# Patient Record
Sex: MALE | Race: WHITE | NOT HISPANIC OR LATINO | Employment: OTHER | URBAN - METROPOLITAN AREA
[De-identification: names, ages, dates, MRNs, and addresses within clinical notes are randomized per-mention and may not be internally consistent; named-entity substitution may affect disease eponyms.]

---

## 2017-01-05 ENCOUNTER — CLINICAL SUPPORT (OUTPATIENT)
Dept: AUDIOLOGY | Facility: CLINIC | Age: 72
End: 2017-01-05
Payer: COMMERCIAL

## 2017-01-05 NOTE — PROGRESS NOTES
Repaired left hearing aid was returned to patient.  Target gain level on the left aid was increased at the patient's request.  The volume control on the left and right aid was set to work independently, at the patient's request.  The patient was instructed on use of the volume control.  The patient returned the loaner hearing aid.  The patient will contact us as needed.

## 2017-01-25 ENCOUNTER — PATIENT OUTREACH (OUTPATIENT)
Dept: ADMINISTRATIVE | Facility: HOSPITAL | Age: 72
End: 2017-01-25

## 2017-01-25 NOTE — LETTER
January 25, 2017    Omar Araujo  [address] [place] [address] [facility]  [address]  [facility] [address]  Phone: [phone] Dear Mr. Araujo:    [facility] is committed to your overall health.  To help you get the most out of each of your visits, we will review your information to make sure you are up to date on all of your recommended tests and/or procedures.      Dr. Warren         has found that you may be due for:    Tetanus immunization  Shingles immunization  Pneumonia immunization  Influenza vaccine    If you have had any of the above done at another facility, please bring the records or information with you so that your record at [facility] will be complete.     If you are currently taking medication , please bring it with you to your appointment for review.    If you have any questions or concerns, please don't hesitate to call.    Sincerely,    Alyssa Byrne  Clinical Care Coordinator  [facility] [address] [facility] [address]  [place] [id number]  Phone: [phone]   Fax: [phone]

## 2017-03-09 ENCOUNTER — OFFICE VISIT (OUTPATIENT)
Dept: FAMILY MEDICINE | Facility: CLINIC | Age: 72
End: 2017-03-09
Payer: MEDICARE

## 2017-03-09 VITALS
OXYGEN SATURATION: 97 % | WEIGHT: 203.25 LBS | HEIGHT: 68 IN | BODY MASS INDEX: 30.8 KG/M2 | DIASTOLIC BLOOD PRESSURE: 78 MMHG | SYSTOLIC BLOOD PRESSURE: 136 MMHG | RESPIRATION RATE: 18 BRPM | HEART RATE: 74 BPM

## 2017-03-09 DIAGNOSIS — Z23 NEED FOR PNEUMOCOCCAL VACCINE: ICD-10-CM

## 2017-03-09 DIAGNOSIS — I10 ESSENTIAL HYPERTENSION: Primary | ICD-10-CM

## 2017-03-09 DIAGNOSIS — E66.3 OVERWEIGHT(278.02): ICD-10-CM

## 2017-03-09 DIAGNOSIS — E78.5 HYPERLIPIDEMIA, UNSPECIFIED HYPERLIPIDEMIA TYPE: ICD-10-CM

## 2017-03-09 DIAGNOSIS — N52.9 ERECTILE DYSFUNCTION, UNSPECIFIED ERECTILE DYSFUNCTION TYPE: ICD-10-CM

## 2017-03-09 DIAGNOSIS — Z12.5 SCREENING PSA (PROSTATE SPECIFIC ANTIGEN): ICD-10-CM

## 2017-03-09 PROCEDURE — 99214 OFFICE O/P EST MOD 30 MIN: CPT | Mod: S$PBB,,, | Performed by: FAMILY MEDICINE

## 2017-03-09 PROCEDURE — 99999 PR PBB SHADOW E&M-EST. PATIENT-LVL III: CPT | Mod: PBBFAC,,, | Performed by: FAMILY MEDICINE

## 2017-03-09 PROCEDURE — 99213 OFFICE O/P EST LOW 20 MIN: CPT | Mod: PBBFAC,PO,25 | Performed by: FAMILY MEDICINE

## 2017-03-09 PROCEDURE — 90670 PCV13 VACCINE IM: CPT | Mod: PBBFAC,PO | Performed by: FAMILY MEDICINE

## 2017-03-09 RX ORDER — TADALAFIL 20 MG/1
20 TABLET ORAL NIGHTLY PRN
Qty: 3 TABLET | Refills: 5 | Status: SHIPPED | OUTPATIENT
Start: 2017-03-09 | End: 2018-07-17

## 2017-03-09 NOTE — PROGRESS NOTES
Subjective:       Patient ID: Omar Araujo is a 71 y.o. male.    Chief Complaint: Follow-up (6 month f/u)    HPI Comments: Here for f/u chronic medical issues.  States doing well overall.      Hypertension   This is a chronic problem. The current episode started more than 1 year ago. The problem is controlled. Pertinent negatives include no chest pain, palpitations or shortness of breath. Past treatments include diuretics and ACE inhibitors. The current treatment provides moderate improvement. There are no compliance problems.    Hyperlipidemia   This is a chronic problem. The current episode started more than 1 year ago. Pertinent negatives include no chest pain or shortness of breath.     Review of Systems   Constitutional: Negative for chills, fatigue and fever.   Respiratory: Negative for cough, chest tightness and shortness of breath.    Cardiovascular: Negative for chest pain, palpitations and leg swelling.   Gastrointestinal: Negative for abdominal distention and abdominal pain.   Endocrine: Negative for cold intolerance and heat intolerance.   Skin: Negative for rash and wound.   Psychiatric/Behavioral: Negative for decreased concentration. The patient is not nervous/anxious.        Objective:      Physical Exam   Constitutional: He appears well-developed and well-nourished.   HENT:   Head: Normocephalic and atraumatic.   Cardiovascular: Normal rate, regular rhythm and normal heart sounds.    Pulmonary/Chest: Effort normal and breath sounds normal.   Abdominal: Soft. Bowel sounds are normal.   Musculoskeletal: Normal range of motion.   Psychiatric: He has a normal mood and affect.   Nursing note and vitals reviewed.      Assessment:       1. Essential hypertension    2. Erectile dysfunction, unspecified erectile dysfunction type    3. Hyperlipidemia, unspecified hyperlipidemia type    4. Overweight(278.02)    5. Screening PSA (prostate specific antigen)    6. Need for pneumococcal vaccine        Plan:        Essential hypertension  -     CBC auto differential; Future; Expected date: 3/9/17  -     Comprehensive metabolic panel; Future; Expected date: 3/9/17  -     Lipid panel; Future; Expected date: 3/9/17  -     TSH; Future; Expected date: 3/9/17    Erectile dysfunction, unspecified erectile dysfunction type  -     Testosterone; Future; Expected date: 3/9/17    Hyperlipidemia, unspecified hyperlipidemia type  -     CBC auto differential; Future; Expected date: 3/9/17  -     Comprehensive metabolic panel; Future; Expected date: 3/9/17  -     Lipid panel; Future; Expected date: 3/9/17  -     TSH; Future; Expected date: 3/9/17    Overweight(278.02)    Screening PSA (prostate specific antigen)  -     PSA, Screening; Future; Expected date: 3/9/17    Need for pneumococcal vaccine  -     Pneumococcal Conjugate Vaccine (13 Valent) (IM)    Other orders  -     tadalafil (CIALIS) 20 MG Tab; Take 1 tablet (20 mg total) by mouth nightly as needed.  Dispense: 3 tablet; Refill: 5      Will monitor chronic medical issues and continue current plan of care.  Update labs.    Return in about 6 months (around 9/9/2017), or if symptoms worsen or fail to improve.

## 2017-03-09 NOTE — MR AVS SNAPSHOT
[facility]  [address] [facility] [address]  [facility] [address]  Phone: [phone]  Fax: [phone]                  Omar HUDDLESTON Dautrspenser   3/9/2017 10:40 AM   Office Visit    Description:  Male : 1945   Provider:  VIOLET Warren MD   Department:  [facility]           Reason for Visit     Follow-up           Diagnoses this Visit        Comments    Essential hypertension    -  Primary     Erectile dysfunction, unspecified erectile dysfunction type         Hyperlipidemia, unspecified hyperlipidemia type         Overweight(278.02)         Screening PSA (prostate specific antigen)         Need for pneumococcal vaccine                To Do List           Goals (5 Years of Data)     None      Follow-Up and Disposition     Return in about 6 months (around 2017), or if symptoms worsen or fail to improve.       These Medications        Disp Refills Start End    tadalafil (CIALIS) 20 MG Tab 3 tablet 5 3/9/2017 3/9/2018    Take 1 tablet (20 mg total) by mouth nightly as needed. - Oral    Pharmacy: [place] [address] [address]  Ph #: [phone]         [facility] [place] [facility]     [facility] Nurse Care Line -  Assistance  Registered nurses in the [facility] provide clinical advisement, health education, appointment booking, and other advisory services.  Call for this free service at [phone].             Medications           Message regarding Medications     Verify the changes and/or additions to your medication regime listed below are the same as discussed with your clinician today.  If any of these changes or additions are incorrect, please notify your healthcare provider.        START taking these NEW medications        Refills    tadalafil (CIALIS) 20 MG Tab 5    Sig: Take 1 tablet (20 mg total) by mouth nightly as needed.    Class: Print    Route: Oral           Verify that the below list of medications is an accurate  "representation of the medications you are currently taking.  If none reported, the list may be blank. If incorrect, please contact your healthcare provider. Carry this list with you in case of emergency.           Current Medications     ascorbic acid (VITAMIN C) 500 MG tablet Take 500 mg by mouth once daily.    aspirin (ECOTRIN) 325 MG EC tablet Take 325 mg by mouth once daily.    calcium citrate 250 mg calcium Tab Take by mouth.    ERGOCALCIFEROL, VITAMIN D2, (VITAMIN D ORAL) Take by mouth.    fish oil-omega-3 fatty acids 300-1,000 mg capsule Take 1 g by mouth once daily.    lisinopril-hydrochlorothiazide (PRINZIDE,ZESTORETIC) 20-12.5 mg per tablet Take 1 tablet by mouth once daily.    lutein 6 mg Cap Take by mouth.    multivitamin (ONE DAILY MULTIVITAMIN) per tablet Take 1 tablet by mouth once daily.    vitamin E 400 UNIT capsule Take 400 Units by mouth once daily.    tadalafil (CIALIS) 20 MG Tab Take 1 tablet (20 mg total) by mouth nightly as needed.           Clinical Reference Information           Your Vitals Were     BP Pulse Resp Height Weight SpO2    136/78 (BP Location: Left arm, Patient Position: Sitting, BP Method: Manual) 74 18 5' 8" (1.727 m) 92.2 kg (203 lb 4.2 oz) 97%    BMI                30.91 kg/m2          Blood Pressure          Most Recent Value    BP  136/78      Allergies as of 3/9/2017     Iodine And Iodide Containing Products      Immunizations Administered on Date of Encounter - 3/9/2017     Name Date Dose VIS Date Route    Pneumococcal Conjugate - 13 Valent 3/9/2017 0.5 mL 11/5/2015 Intramuscular      Orders Placed During Today's Visit      Normal Orders This Visit    Pneumococcal Conjugate Vaccine (13 Valent) (IM)     Future Labs/Procedures Expected by Expires    CBC auto differential  3/9/2017 9/5/2017    Comprehensive metabolic panel  3/9/2017 9/5/2017    Lipid panel  3/9/2017 9/5/2017    PSA, Screening  3/9/2017 9/5/2017    Testosterone  3/9/2017 5/8/2018    TSH  3/9/2017 9/5/2017    "   [facility] Sign-Up     Activating your [facility] account is as easy as 1-2-3!     1) Visit [email or web address], select Sign Up Now, enter this activation code and your date of birth, then select Next.  [email or web address]  Expires: 4/23/2017 11:18 AM      2) Create a username and password to use when you visit [facility] in the future and select a security question in case you lose your password and select Next.    3) Enter your e-mail address and click Sign Up!    Additional Information  If you have questions, please e-mail [email or web address] or call [phone] to talk to our [facility] staff. Remember, [facility] is NOT to be used for urgent needs. For medical emergencies, dial 911.         Language Assistance Services     ATTENTION: Language assistance services are available, free of charge. Please call [phone].      ATENCIÓN: Si habla petty, tiene a toscano disposición servicios gratuitos de asistencia lingüística. Llame al [phone].     CHÚ Ý: N?u b?n nói Ti?ng Vi?t, có các d?ch v? h? tr? ngôn ng? mi?n phí dành cho b?n. G?i s? [phone].         [facility] complies with applicable Federal civil rights laws and does not discriminate on the basis of race, color, national origin, age, disability, or sex.

## 2017-05-18 ENCOUNTER — CLINICAL SUPPORT (OUTPATIENT)
Dept: AUDIOLOGY | Facility: CLINIC | Age: 72
End: 2017-05-18
Payer: COMMERCIAL

## 2017-05-19 NOTE — PROGRESS NOTES
The patient was seen in the hearing aid clinic and reported each of his hearing aids were not working.  A listening check revealed each aid to be essentially dead.  The vent wall on the right and left cShell were found to be cracked and the  on one of the hearing aids was found to be embedded in the cShell.  The right and left hearing aid was sent for an in warranty repair.  The patient does not wish to have the cShell casing repaired at this time, since this is not under warranty.  A loaner hearing aid was programmed for each ear and issued to the patient for him to use while his hearing aids are being repaired.  The patient should be contacted upon receipt of his repaired aids, so that he can arrange  at the .

## 2017-06-05 ENCOUNTER — LAB VISIT (OUTPATIENT)
Dept: LAB | Facility: HOSPITAL | Age: 72
End: 2017-06-05
Attending: FAMILY MEDICINE
Payer: MEDICARE

## 2017-06-05 DIAGNOSIS — I10 ESSENTIAL HYPERTENSION: ICD-10-CM

## 2017-06-05 DIAGNOSIS — E78.5 HYPERLIPIDEMIA, UNSPECIFIED HYPERLIPIDEMIA TYPE: ICD-10-CM

## 2017-06-05 DIAGNOSIS — N52.9 ERECTILE DYSFUNCTION, UNSPECIFIED ERECTILE DYSFUNCTION TYPE: ICD-10-CM

## 2017-06-05 DIAGNOSIS — Z12.5 SCREENING PSA (PROSTATE SPECIFIC ANTIGEN): ICD-10-CM

## 2017-06-05 LAB
ALBUMIN SERPL BCP-MCNC: 3.7 G/DL
ALP SERPL-CCNC: 79 U/L
ALT SERPL W/O P-5'-P-CCNC: 26 U/L
ANION GAP SERPL CALC-SCNC: 11 MMOL/L
AST SERPL-CCNC: 18 U/L
BASOPHILS # BLD AUTO: 0.03 K/UL
BASOPHILS NFR BLD: 0.4 %
BILIRUB SERPL-MCNC: 0.8 MG/DL
BUN SERPL-MCNC: 23 MG/DL
CALCIUM SERPL-MCNC: 9.6 MG/DL
CHLORIDE SERPL-SCNC: 103 MMOL/L
CHOLEST/HDLC SERPL: 3.4 {RATIO}
CO2 SERPL-SCNC: 25 MMOL/L
COMPLEXED PSA SERPL-MCNC: 0.73 NG/ML
CREAT SERPL-MCNC: 1.2 MG/DL
DIFFERENTIAL METHOD: ABNORMAL
EOSINOPHIL # BLD AUTO: 0.1 K/UL
EOSINOPHIL NFR BLD: 1.9 %
ERYTHROCYTE [DISTWIDTH] IN BLOOD BY AUTOMATED COUNT: 13.1 %
EST. GFR  (AFRICAN AMERICAN): >60 ML/MIN/1.73 M^2
EST. GFR  (NON AFRICAN AMERICAN): >60 ML/MIN/1.73 M^2
GLUCOSE SERPL-MCNC: 102 MG/DL
HCT VFR BLD AUTO: 49.5 %
HDL/CHOLESTEROL RATIO: 29.3 %
HDLC SERPL-MCNC: 205 MG/DL
HDLC SERPL-MCNC: 60 MG/DL
HGB BLD-MCNC: 16.8 G/DL
LDLC SERPL CALC-MCNC: 123.6 MG/DL
LYMPHOCYTES # BLD AUTO: 1.4 K/UL
LYMPHOCYTES NFR BLD: 20 %
MCH RBC QN AUTO: 34.1 PG
MCHC RBC AUTO-ENTMCNC: 33.9 %
MCV RBC AUTO: 101 FL
MONOCYTES # BLD AUTO: 0.4 K/UL
MONOCYTES NFR BLD: 6.3 %
NEUTROPHILS # BLD AUTO: 4.9 K/UL
NEUTROPHILS NFR BLD: 71.1 %
NONHDLC SERPL-MCNC: 145 MG/DL
PLATELET # BLD AUTO: 150 K/UL
PMV BLD AUTO: 12.2 FL
POTASSIUM SERPL-SCNC: 4.6 MMOL/L
PROT SERPL-MCNC: 6.9 G/DL
RBC # BLD AUTO: 4.92 M/UL
SODIUM SERPL-SCNC: 139 MMOL/L
TESTOST SERPL-MCNC: 297 NG/DL
TRIGL SERPL-MCNC: 107 MG/DL
TSH SERPL DL<=0.005 MIU/L-ACNC: 1.49 UIU/ML
WBC # BLD AUTO: 6.95 K/UL

## 2017-06-05 PROCEDURE — 80061 LIPID PANEL: CPT

## 2017-06-05 PROCEDURE — 36415 COLL VENOUS BLD VENIPUNCTURE: CPT | Mod: PO

## 2017-06-05 PROCEDURE — 80053 COMPREHEN METABOLIC PANEL: CPT

## 2017-06-05 PROCEDURE — 84443 ASSAY THYROID STIM HORMONE: CPT

## 2017-06-05 PROCEDURE — 85025 COMPLETE CBC W/AUTO DIFF WBC: CPT

## 2017-06-05 PROCEDURE — 84403 ASSAY OF TOTAL TESTOSTERONE: CPT

## 2017-06-05 PROCEDURE — 84153 ASSAY OF PSA TOTAL: CPT

## 2017-06-21 ENCOUNTER — TELEPHONE (OUTPATIENT)
Dept: FAMILY MEDICINE | Facility: CLINIC | Age: 72
End: 2017-06-21

## 2017-06-21 NOTE — TELEPHONE ENCOUNTER
----- Message from Salvatore Knott sent at 6/21/2017 11:56 AM CDT -----  Contact: Pt  Pt is requesting that a copy of his lab results be mailed to him./ He can be reached at [phone] (home)

## 2017-06-21 NOTE — TELEPHONE ENCOUNTER
See message below. Please advise. Thank you.  You can let me know when mailed and I can notify pt if you would like.

## 2017-06-27 ENCOUNTER — TELEPHONE (OUTPATIENT)
Dept: FAMILY MEDICINE | Facility: CLINIC | Age: 72
End: 2017-06-27

## 2017-06-27 NOTE — TELEPHONE ENCOUNTER
Spoke to pt and he states he still has not received the results via mail. Would like if they can be mailed out again. Please mail out pt results again. Thanks.

## 2017-06-27 NOTE — TELEPHONE ENCOUNTER
----- Message from Karen Landrum sent at 6/27/2017 10:57 AM CDT -----  Contact:  call  //[phone]    Calling to  Get  Test  Results  And a  Copy  , pt  Requested  Twice // please call  For  details

## 2017-08-01 ENCOUNTER — CLINICAL SUPPORT (OUTPATIENT)
Dept: AUDIOLOGY | Facility: CLINIC | Age: 72
End: 2017-08-01
Payer: MEDICARE

## 2017-08-01 NOTE — PROGRESS NOTES
The patient was seen in the hearing aid clinic and reported both of his hearing aids are not working.  The hearing aids were cleaned and checked.  A listening check revealed the right aid to be dead and the left aid to be weak.  Both aids will be sent for an in-warranty repair with attention to [employer].  The patient was issued a loaner hearing aid for each ear, and the loaners were programmed for the patient's hearing loss.  The patient was given a Dri-Aid Kit, and it was recommended that the patient use this kit on a regular basis.  The patient was instructed on use of the kit (verbally and in writing).  The patient should be contacted upon receipt of the repaired aids so that he can pick the aids up at the .

## 2017-09-01 RX ORDER — LISINOPRIL AND HYDROCHLOROTHIAZIDE 12.5; 2 MG/1; MG/1
1 TABLET ORAL DAILY
Qty: 30 TABLET | Refills: 5 | Status: SHIPPED | OUTPATIENT
Start: 2017-09-01 | End: 2017-09-06 | Stop reason: SDUPTHER

## 2017-09-06 NOTE — TELEPHONE ENCOUNTER
Please see request for refill   LOV 3/9/2017  No OV on schedule for follow up appointment with PCP.

## 2017-09-07 RX ORDER — LISINOPRIL AND HYDROCHLOROTHIAZIDE 12.5; 2 MG/1; MG/1
1 TABLET ORAL DAILY
Qty: 30 TABLET | Refills: 0 | Status: SHIPPED | OUTPATIENT
Start: 2017-09-07 | End: 2018-04-03 | Stop reason: SDUPTHER

## 2017-09-11 ENCOUNTER — CLINICAL SUPPORT (OUTPATIENT)
Dept: AUDIOLOGY | Facility: CLINIC | Age: 72
End: 2017-09-11
Payer: MEDICARE

## 2017-09-11 NOTE — PROGRESS NOTES
Patient was seen in the hearing aid clinic and reported he wanted to change the left and right cShell to a dome.  He reported he preferred using the domes when he had loaner aids recently, since they go deeper into his ear canals.  The cShells were replaced with a right and left small closed dome.  The right and left hearing aid was reprogrammed using this different acoustic parameter and a size standard  in each ear.  Feedback Control was run and applied for each aid.  The patient demonstrated appropriate insertion of each aid, and indicated he did not wish to have a retention hook on either aid.  Extra domes were given to the patient.  The patient will contact us as needed.

## 2017-10-18 ENCOUNTER — CLINICAL SUPPORT (OUTPATIENT)
Dept: AUDIOLOGY | Facility: CLINIC | Age: 72
End: 2017-10-18
Payer: MEDICARE

## 2017-10-18 NOTE — PROGRESS NOTES
The patient was seen in the hearing aid clinic and reported his right aid was dead.  The filter was changed on this aid, and a listening check then revealed the aid to sound good.  The patient will contact us as needed.

## 2018-03-20 ENCOUNTER — TELEPHONE (OUTPATIENT)
Dept: AUDIOLOGY | Facility: CLINIC | Age: 73
End: 2018-03-20

## 2018-03-20 NOTE — TELEPHONE ENCOUNTER
Informed pt that the right hearing aid warranty recently  but is eligible for renewal for one final year to cover repairs and Loss/Damage.  Pt confirmed that he did want to renew the warranty for the right hearing aid so this will be completed and the renewal fee will be charged to the patient's account.

## 2018-03-21 ENCOUNTER — CLINICAL SUPPORT (OUTPATIENT)
Dept: AUDIOLOGY | Facility: CLINIC | Age: 73
End: 2018-03-21
Payer: MEDICARE

## 2018-03-21 DIAGNOSIS — Z97.4 WEARS HEARING AID: Primary | ICD-10-CM

## 2018-04-03 DIAGNOSIS — I10 ESSENTIAL HYPERTENSION: Primary | ICD-10-CM

## 2018-04-04 RX ORDER — LISINOPRIL AND HYDROCHLOROTHIAZIDE 12.5; 2 MG/1; MG/1
1 TABLET ORAL DAILY
Qty: 90 TABLET | Refills: 0 | Status: SHIPPED | OUTPATIENT
Start: 2018-04-04 | End: 2018-07-24 | Stop reason: SDUPTHER

## 2018-04-04 NOTE — PROGRESS NOTES
Refill Authorization Note     is requesting a refill authorization.    Brief assessment and rationale for refill: APPROVE: needs labs  Amount/Quantity of medication ordered: 90d        Refills Authorized: Yes  If authorized number of refills: 0        Medication-related problems identified: Requires labs  Medication Therapy Plan: Will order SCR/k/Na; kellie 3 more   Name and strength of medication: lisinopril-hydrochlorothiazide (PRINZIDE,ZESTORETIC) 20-12.5 mg per tablet  How patient will take medication: t1t po daily  Medication reconciliation completed: No  Comments:   Lab Results   Component Value Date    CREATININE 1.2 06/05/2017    BUN 23 06/05/2017     06/05/2017    K 4.6 06/05/2017     06/05/2017    CO2 25 06/05/2017      BP Readings from Last 3 Encounters:   03/09/17 136/78   07/18/16 114/83   06/13/16 136/84

## 2018-04-07 ENCOUNTER — LAB VISIT (OUTPATIENT)
Dept: LAB | Facility: HOSPITAL | Age: 73
End: 2018-04-07
Attending: FAMILY MEDICINE
Payer: MEDICARE

## 2018-04-07 DIAGNOSIS — I10 ESSENTIAL HYPERTENSION: ICD-10-CM

## 2018-04-07 LAB
CREAT SERPL-MCNC: 1.2 MG/DL
EST. GFR  (AFRICAN AMERICAN): >60 ML/MIN/1.73 M^2
EST. GFR  (NON AFRICAN AMERICAN): >60 ML/MIN/1.73 M^2
POTASSIUM SERPL-SCNC: 4.5 MMOL/L
SODIUM SERPL-SCNC: 140 MMOL/L

## 2018-04-07 PROCEDURE — 84132 ASSAY OF SERUM POTASSIUM: CPT

## 2018-04-07 PROCEDURE — 36415 COLL VENOUS BLD VENIPUNCTURE: CPT | Mod: PO

## 2018-04-07 PROCEDURE — 82565 ASSAY OF CREATININE: CPT

## 2018-04-07 PROCEDURE — 84295 ASSAY OF SERUM SODIUM: CPT

## 2018-04-09 ENCOUNTER — TELEPHONE (OUTPATIENT)
Dept: FAMILY MEDICINE | Facility: CLINIC | Age: 73
End: 2018-04-09

## 2018-04-09 NOTE — TELEPHONE ENCOUNTER
----- Message from Nia Paul sent at 4/9/2018  7:26 AM CDT -----  Type:  Patient Returning Call  Who Called:  self  Who Left Message for Patient:  Nelida  Does the patient know what this is regarding? Copy of lab results  Best Call Back Number: [phone]  Additional Information:

## 2018-04-23 ENCOUNTER — TELEPHONE (OUTPATIENT)
Dept: FAMILY MEDICINE | Facility: CLINIC | Age: 73
End: 2018-04-23

## 2018-04-23 NOTE — LETTER
April 23, 2018    Omar Araujo  [address]  [address] [facility] [address] [place] [address]  Phone: [phone]  Fax: [phone] Dear {MR/MRS/MS/:14792} Leydiil:    ***      If you have any questions or concerns, please don't hesitate to call.    Sincerely,        Sindy Heck LPN

## 2018-04-23 NOTE — LETTER
April 23, 2018    Omar Araujo  [address]  [address] [facility]  [facility] [address]  Phone: [phone]  Fax: [phone] Dear Mr. Araujo:    I am sending you a copy of the labs results that you requested.    Also for your convenience, I have included a copy of you past labs as well.     If you have any questions or concerns, please don't hesitate to call.    Thank you for choosing [facility] and do make it a great day.       Sincerely,        Sindy Heck LPN

## 2018-04-23 NOTE — TELEPHONE ENCOUNTER
----- Message from Olivia Lopez sent at 4/23/2018  9:50 AM CDT -----  Contact: pt [phone]  Patient called and asked if you will send him a copy of his recent labs test.

## 2018-04-24 ENCOUNTER — CLINICAL SUPPORT (OUTPATIENT)
Dept: AUDIOLOGY | Facility: CLINIC | Age: 73
End: 2018-04-24
Payer: MEDICARE

## 2018-04-24 NOTE — PROGRESS NOTES
The patient was seen in the hearing aid clinic.  He requested that his volume control be set so that he can control the volume on each aid independently. Quick Sync was deactivated for volume control today, and the patient was instructed on how to manipulate the volume control buttons. The patient requested a retention hook be placed on each . This was done today, and the patient was instructed on how to insert the aids properly with the added retention hooks, and he demonstrated this properly.  The wax filter on each aid was changed, and a listening check revealed each aid to sound good. The patient requested reinstruction on how to change the wax filter.  This was reviewed today, and the patient demonstrated this appropriately. An annual hearing evaluation was recommended.  The pateint scheduled this for May 18, 2018.

## 2018-05-16 DIAGNOSIS — H91.90 HEARING DIFFICULTY, UNSPECIFIED LATERALITY: Primary | ICD-10-CM

## 2018-05-18 ENCOUNTER — CLINICAL SUPPORT (OUTPATIENT)
Dept: AUDIOLOGY | Facility: CLINIC | Age: 73
End: 2018-05-18
Payer: MEDICARE

## 2018-05-18 ENCOUNTER — OFFICE VISIT (OUTPATIENT)
Dept: OTOLARYNGOLOGY | Facility: CLINIC | Age: 73
End: 2018-05-18
Payer: MEDICARE

## 2018-05-18 VITALS
WEIGHT: 205.94 LBS | HEIGHT: 68 IN | BODY MASS INDEX: 31.21 KG/M2 | HEART RATE: 68 BPM | SYSTOLIC BLOOD PRESSURE: 140 MMHG | DIASTOLIC BLOOD PRESSURE: 87 MMHG

## 2018-05-18 DIAGNOSIS — H61.22 IMPACTED CERUMEN OF LEFT EAR: ICD-10-CM

## 2018-05-18 DIAGNOSIS — H93.293 IMPAIRED AUDITORY DISCRIMINATION, BILATERAL: ICD-10-CM

## 2018-05-18 DIAGNOSIS — H90.3 BILATERAL SENSORINEURAL HEARING LOSS: Primary | ICD-10-CM

## 2018-05-18 DIAGNOSIS — Z97.4 WEARS HEARING AID: ICD-10-CM

## 2018-05-18 DIAGNOSIS — H90.3 ASYMMETRICAL SENSORINEURAL HEARING LOSS: Primary | ICD-10-CM

## 2018-05-18 PROCEDURE — 99211 OFF/OP EST MAY X REQ PHY/QHP: CPT | Mod: PBBFAC,PO

## 2018-05-18 PROCEDURE — G0268 REMOVAL OF IMPACTED WAX MD: HCPCS | Mod: S$PBB,,, | Performed by: NURSE PRACTITIONER

## 2018-05-18 PROCEDURE — 99213 OFFICE O/P EST LOW 20 MIN: CPT | Mod: 25,S$PBB,, | Performed by: NURSE PRACTITIONER

## 2018-05-18 PROCEDURE — 92567 TYMPANOMETRY: CPT | Mod: PBBFAC,PO | Performed by: AUDIOLOGIST-HEARING AID FITTER

## 2018-05-18 PROCEDURE — G0268 REMOVAL OF IMPACTED WAX MD: HCPCS | Mod: PBBFAC,PO,LT | Performed by: NURSE PRACTITIONER

## 2018-05-18 PROCEDURE — 92557 COMPREHENSIVE HEARING TEST: CPT | Mod: PBBFAC,PO | Performed by: AUDIOLOGIST-HEARING AID FITTER

## 2018-05-18 PROCEDURE — 99999 PR PBB SHADOW E&M-EST. PATIENT-LVL III: CPT | Mod: PBBFAC,,, | Performed by: NURSE PRACTITIONER

## 2018-05-18 PROCEDURE — 99213 OFFICE O/P EST LOW 20 MIN: CPT | Mod: PBBFAC,27,PO,25 | Performed by: NURSE PRACTITIONER

## 2018-05-18 PROCEDURE — 99999 PR PBB SHADOW E&M-EST. PATIENT-LVL I: CPT | Mod: PBBFAC,,,

## 2018-05-18 NOTE — PROGRESS NOTES
The patient was seen for a hearing aid follow-up appointment after having a hearing evaluation performed today. Today's hearing thresholds were entered into the hearing aid software, and the programming for the left and right hearing aid was recalculated using the current hearing thresholds. The speaker on the left aid was changed to a size 2xP speaker.  Feedback Control was run and applied for the left aid. The wax filter on the right hearing aid was changed. A listening check revealed each aid to sound good. A replacement Dri-Aid Kit was given to the patient with instructions on how to use it. Extra domes and wax filters were given to the patient. An annual audiological evaluation was recommended. The patient will contact us as needed.

## 2018-05-18 NOTE — PROGRESS NOTES
Omar Araujo was seen 05/18/2018 for an audiological evaluation. Patient complains of hearing loss. Pt reports a strong Hx of noise exposure with hunting, sometimes without hearing protection. He denies tinnitus. He wears binaural amplification. He had a tympanoplasty procedure by Dr Cueto in 2015. He does not perceive any significant changes since his last test.     Results reveal a normal-to-severe sensorineural hearing loss for the right ear, and  moderate-to-profound primarily sensorineural hearing loss for the left ear with the exception of an air/bone gap at 4K Hz. Pt has a Hx of that same air/bone gap.  No significant changes were noted when compared to previous test on 1215/16.  Speech Reception Thresholds were  35 dBHL for the right ear and 45 dBHL for the left ear.    Word recognition scores were good for the right ear and good for the left ear.   Tympanograms were Type As for the right ear and Type As for the left ear.    Audiogram results were reviewed in detail with patient and all questions were answered. Results will be reviewed by ENT at the completion of this note. Recommend continued daily use binaural amplification, annual hearing tests to monitor hearing loss and hearing protection in loud noise.

## 2018-05-18 NOTE — PROGRESS NOTES
Subjective:       Patient ID: Omar Araujo is a 72 y.o. male.    Chief Complaint: Cerumen Impaction and Hearing Loss (audio today/ annual)    HPI   Patient had left tympanoplasty by Dr. Cueto on 3/3/15. He wears bilateral hearing aids. He returns for audiogram and hearing aid adjustment. He denies otalgia, otorrhea, aural pressure, or any other ENT issues or concerns at this time.     Review of Systems   Constitutional: Negative.    HENT: Positive for hearing loss.    Eyes: Negative.    Respiratory: Negative.    Cardiovascular: Negative.    Gastrointestinal: Negative.    Musculoskeletal: Negative.    Skin: Negative.    Neurological: Negative.    Hematological: Negative.    Psychiatric/Behavioral: Negative.        Objective:      Physical Exam   Constitutional: He is oriented to person, place, and time. Vital signs are normal. He appears well-developed and well-nourished. He is cooperative. He does not appear ill. No distress.   HENT:   Head: Normocephalic and atraumatic.   Right Ear: Hearing, tympanic membrane, external ear and ear canal normal. Tympanic membrane is not erythematous. No middle ear effusion.   Left Ear: Hearing, tympanic membrane, external ear and ear canal normal. Tympanic membrane is not erythematous.  No middle ear effusion.   Nose: Nose normal. No mucosal edema or rhinorrhea.   Mouth/Throat: Uvula is midline, oropharynx is clear and moist and mucous membranes are normal. Mucous membranes are not pale, not dry and not cyanotic. No oral lesions. No oropharyngeal exudate, posterior oropharyngeal edema or posterior oropharyngeal erythema.   Eyes: EOM and lids are normal. Right eye exhibits no discharge. Left eye exhibits no discharge. No scleral icterus.   Neck: Trachea normal and normal range of motion. Neck supple. No tracheal deviation present. No thyroid mass and no thyromegaly present.   Cardiovascular: Normal rate.    Pulmonary/Chest: Effort normal. No stridor. No respiratory distress.  He has no wheezes.   Musculoskeletal: Normal range of motion.   Lymphadenopathy:        Head (right side): No submental, no submandibular, no tonsillar, no preauricular and no posterior auricular adenopathy present.        Head (left side): No submental, no submandibular, no tonsillar, no preauricular and no posterior auricular adenopathy present.     He has no cervical adenopathy.        Right cervical: No superficial cervical and no posterior cervical adenopathy present.       Left cervical: No superficial cervical and no posterior cervical adenopathy present.   Neurological: He is alert and oriented to person, place, and time. He has normal strength. Coordination and gait normal.   Skin: Skin is warm, dry and intact. No lesion and no rash noted. He is not diaphoretic. No cyanosis. No pallor.   Psychiatric: He has a normal mood and affect. His speech is normal and behavior is normal. Judgment and thought content normal. Cognition and memory are normal.   Nursing note and vitals reviewed.      SEPARATE PROCEDURE IN OFFICE:   Procedure: Removal of impacted cerumen, LEFT   Pre Procedure Diagnosis: Cerumen Impaction   Post Procedure Diagnosis: Cerumen Impaction   Verbal informed consent in regards to risk of trauma to ear canal, ear drum or hearing, discomfort during procedure and/or inability to remove cerumen impaction in one session or unforeseen events or complications.   No anesthesia.     Procedure in detail:   Ear canal visualized bilateral with appropriate size ear speculum utilizing Operating Head Binocular Otomicroscope   Utilizing the following:  Curet used AS. The impacted cerumen of the ear canals was removed atraumatically. The TM and EAC were then inspected and found to be clear of wax. See description of TMs/EACs in PE above.   Complications: No   Condition: Improved/Good     Assessment:     Asymmetric (rajwinder) sloping SNHL AU    Wears hearing aids  Plan:     Proceed with hearing aid adjustment     Return as needed for further ENT concerns

## 2018-06-28 ENCOUNTER — CLINICAL SUPPORT (OUTPATIENT)
Dept: AUDIOLOGY | Facility: CLINIC | Age: 73
End: 2018-06-28
Payer: MEDICARE

## 2018-06-28 NOTE — PROGRESS NOTES
The patient was seen in the hearing aid clinic and reported the right hearing aid does not fit as well as the left aid.  The  wire was changed to a size 1, and the retention hook was changed and shortened.  If this is not saitisfactory, the patient will let us know and we may go back to the size 2  wire with the shortened retention hook.  The patient will contact us as needed.

## 2018-06-29 ENCOUNTER — TELEPHONE (OUTPATIENT)
Dept: FAMILY MEDICINE | Facility: CLINIC | Age: 73
End: 2018-06-29

## 2018-06-29 NOTE — TELEPHONE ENCOUNTER
----- Message from Marzena Nieto sent at 6/29/2018 11:14 AM CDT -----  Contact: Patient  Type:  Patient Returning Call    Who Called:  Omar patient   Who Left Message for Patient:  Marli  Does the patient know what this is regarding?:  Unsure  Best Call Back Number:  [phone]  Additional Information:

## 2018-06-29 NOTE — TELEPHONE ENCOUNTER
Received request for surgical clearance from Dr Avery DDS with [facility]. However, patient has not been seen in clinic since 3/2017 and requires a visit to complete this clearance.    Called and left message to call back and ask for assistance with scheduling.   Procedure date is unknown at the time of this encounter.

## 2018-06-29 NOTE — TELEPHONE ENCOUNTER
----- Message from Marzena Nieto sent at 6/29/2018 12:34 PM CDT -----  Contact: Patient  Type:  Patient Returning Call    Who Called:  Omar  Who Left Message for Patient:  Marli  Does the patient know what this is regarding?: Unsure  Best Call Back Number:  [phone]  Additional Information:

## 2018-06-29 NOTE — TELEPHONE ENCOUNTER
----- Message from Marzena Nieto sent at 6/29/2018 11:14 AM CDT -----  Contact: Patient  Type:  Patient Returning Call    Who Called:  Omar patient   Who Left Message for Patient:  Marli  Does the patient know what this is regarding?:  Unsure  Best Call Back Number:  [phone]  Additional Information:

## 2018-07-16 ENCOUNTER — TELEPHONE (OUTPATIENT)
Dept: FAMILY MEDICINE | Facility: CLINIC | Age: 73
End: 2018-07-16

## 2018-07-17 ENCOUNTER — OFFICE VISIT (OUTPATIENT)
Dept: FAMILY MEDICINE | Facility: CLINIC | Age: 73
End: 2018-07-17
Payer: MEDICARE

## 2018-07-17 ENCOUNTER — LAB VISIT (OUTPATIENT)
Dept: LAB | Facility: HOSPITAL | Age: 73
End: 2018-07-17
Attending: NURSE PRACTITIONER
Payer: MEDICARE

## 2018-07-17 VITALS
RESPIRATION RATE: 14 BRPM | OXYGEN SATURATION: 99 % | SYSTOLIC BLOOD PRESSURE: 126 MMHG | BODY MASS INDEX: 31.24 KG/M2 | WEIGHT: 206.13 LBS | HEART RATE: 75 BPM | HEIGHT: 68 IN | DIASTOLIC BLOOD PRESSURE: 74 MMHG | TEMPERATURE: 98 F

## 2018-07-17 DIAGNOSIS — Z79.899 MEDICATION MANAGEMENT: ICD-10-CM

## 2018-07-17 DIAGNOSIS — Z01.818 PRE-OP EXAMINATION: Primary | ICD-10-CM

## 2018-07-17 DIAGNOSIS — Z12.5 ENCOUNTER FOR SCREENING FOR MALIGNANT NEOPLASM OF PROSTATE: ICD-10-CM

## 2018-07-17 DIAGNOSIS — I10 ESSENTIAL HYPERTENSION: ICD-10-CM

## 2018-07-17 DIAGNOSIS — Z01.818 PRE-OP EXAMINATION: ICD-10-CM

## 2018-07-17 LAB
ALBUMIN SERPL BCP-MCNC: 4.2 G/DL
ALP SERPL-CCNC: 72 U/L
ALT SERPL W/O P-5'-P-CCNC: 27 U/L
ANION GAP SERPL CALC-SCNC: 10 MMOL/L
AST SERPL-CCNC: 19 U/L
BASOPHILS # BLD AUTO: 0.05 K/UL
BASOPHILS NFR BLD: 0.8 %
BILIRUB SERPL-MCNC: 0.7 MG/DL
BUN SERPL-MCNC: 29 MG/DL
CALCIUM SERPL-MCNC: 10.5 MG/DL
CHLORIDE SERPL-SCNC: 104 MMOL/L
CO2 SERPL-SCNC: 25 MMOL/L
COMPLEXED PSA SERPL-MCNC: 0.7 NG/ML
CREAT SERPL-MCNC: 1.3 MG/DL
DIFFERENTIAL METHOD: ABNORMAL
EOSINOPHIL # BLD AUTO: 0.2 K/UL
EOSINOPHIL NFR BLD: 3 %
ERYTHROCYTE [DISTWIDTH] IN BLOOD BY AUTOMATED COUNT: 12.9 %
EST. GFR  (AFRICAN AMERICAN): >60 ML/MIN/1.73 M^2
EST. GFR  (NON AFRICAN AMERICAN): 54.5 ML/MIN/1.73 M^2
GLUCOSE SERPL-MCNC: 103 MG/DL
HCT VFR BLD AUTO: 49.9 %
HGB BLD-MCNC: 16.9 G/DL
IMM GRANULOCYTES # BLD AUTO: 0.01 K/UL
IMM GRANULOCYTES NFR BLD AUTO: 0.2 %
LYMPHOCYTES # BLD AUTO: 1.6 K/UL
LYMPHOCYTES NFR BLD: 25 %
MCH RBC QN AUTO: 34.1 PG
MCHC RBC AUTO-ENTMCNC: 33.9 G/DL
MCV RBC AUTO: 101 FL
MONOCYTES # BLD AUTO: 0.5 K/UL
MONOCYTES NFR BLD: 7.8 %
NEUTROPHILS # BLD AUTO: 4 K/UL
NEUTROPHILS NFR BLD: 63.2 %
NRBC BLD-RTO: 0 /100 WBC
PLATELET # BLD AUTO: 149 K/UL
PMV BLD AUTO: 12.4 FL
POTASSIUM SERPL-SCNC: 4.6 MMOL/L
PROT SERPL-MCNC: 7.6 G/DL
RBC # BLD AUTO: 4.95 M/UL
SODIUM SERPL-SCNC: 139 MMOL/L
TSH SERPL DL<=0.005 MIU/L-ACNC: 1.2 UIU/ML
WBC # BLD AUTO: 6.32 K/UL

## 2018-07-17 PROCEDURE — 85025 COMPLETE CBC W/AUTO DIFF WBC: CPT

## 2018-07-17 PROCEDURE — 99214 OFFICE O/P EST MOD 30 MIN: CPT | Mod: PBBFAC,PO | Performed by: NURSE PRACTITIONER

## 2018-07-17 PROCEDURE — 99214 OFFICE O/P EST MOD 30 MIN: CPT | Mod: S$PBB,,, | Performed by: NURSE PRACTITIONER

## 2018-07-17 PROCEDURE — 99999 PR PBB SHADOW E&M-EST. PATIENT-LVL IV: CPT | Mod: PBBFAC,,, | Performed by: NURSE PRACTITIONER

## 2018-07-17 PROCEDURE — 36415 COLL VENOUS BLD VENIPUNCTURE: CPT | Mod: PO

## 2018-07-17 PROCEDURE — 84443 ASSAY THYROID STIM HORMONE: CPT

## 2018-07-17 PROCEDURE — 80053 COMPREHEN METABOLIC PANEL: CPT

## 2018-07-17 PROCEDURE — 84153 ASSAY OF PSA TOTAL: CPT

## 2018-07-17 NOTE — PROGRESS NOTES
Subjective:       Patient ID: Omar Araujo is a 72 y.o. male.    Chief Complaint: Pre-op Exam (dental implants-oral meds with local)    Mr. Araujo is a new patient to me. He presents today for pre op clearance    HPI   Scheduled for extractions and dental implant placement on 7/30/18 by Dr. Varela at [facility] for dental [facility].    On ASA daily--stopped about 4 days ago.     HTN: controlled    Due for labs  Vitals:    07/17/18 1001   BP: 126/74   Pulse: 75   Resp: 14   Temp: 98.3 °F (36.8 °C)     Review of Systems   Constitutional: Negative for diaphoresis and fever.   HENT: Negative for facial swelling and trouble swallowing.    Eyes: Negative for discharge and redness.   Respiratory: Negative for cough and shortness of breath.    Cardiovascular: Negative for chest pain and palpitations.   Gastrointestinal: Negative for abdominal pain and diarrhea.   Genitourinary: Negative for difficulty urinating.   Musculoskeletal: Negative for gait problem.   Skin: Negative for rash and wound.   Neurological: Negative for dizziness, facial asymmetry and speech difficulty.   Psychiatric/Behavioral: Negative for confusion. The patient is not nervous/anxious.        Past Medical History:   Diagnosis Date    Hypertension      Objective:      Physical Exam   Constitutional: He is oriented to person, place, and time. He does not have a sickly appearance. No distress.   HENT:   Head: Normocephalic.   Right Ear: Hearing normal.   Left Ear: Hearing normal.   Nose: Nose normal.   Eyes: Conjunctivae and lids are normal.   Neck: No JVD present. No tracheal deviation present.   Cardiovascular: Normal rate and normal heart sounds.    Pulmonary/Chest: Effort normal and breath sounds normal.   Abdominal: Normal appearance. He exhibits no distension.   Musculoskeletal: Normal range of motion. He exhibits no deformity.   Neurological: He is alert and oriented to person, place, and time.   Skin: He is not diaphoretic. No  pallor.   Psychiatric: He has a normal mood and affect. His speech is normal and behavior is normal. Judgment and thought content normal. Cognition and memory are normal.   Nursing note and vitals reviewed.      Assessment:       1. Pre-op examination    2. Essential hypertension    3. Encounter for screening for malignant neoplasm of prostate    4. Medication management        Plan:       Pre-op examination  -     CBC auto differential; Future; Expected date: 07/17/2018  -     Comprehensive metabolic panel; Future; Expected date: 07/17/2018    Essential hypertension  -     Comprehensive metabolic panel; Future; Expected date: 07/17/2018  -     TSH; Future; Expected date: 07/17/2018    Encounter for screening for malignant neoplasm of prostate  -     PSA, Screening; Future; Expected date: 07/17/2018    Medication management   Continue to hold ASA prior to procedure; resume post procedure based on Dr. Varela's recommendations       Patient is cleared for scheduled procedure   Follow-up for q6months with PCP, me as needed.

## 2018-07-18 DIAGNOSIS — I10 ESSENTIAL HYPERTENSION: Primary | ICD-10-CM

## 2018-07-18 DIAGNOSIS — E78.2 MIXED HYPERLIPIDEMIA: ICD-10-CM

## 2018-07-24 RX ORDER — LISINOPRIL AND HYDROCHLOROTHIAZIDE 12.5; 2 MG/1; MG/1
1 TABLET ORAL DAILY
Qty: 90 TABLET | Refills: 0 | Status: ON HOLD | OUTPATIENT
Start: 2018-07-24 | End: 2018-08-31 | Stop reason: HOSPADM

## 2018-07-24 NOTE — PROGRESS NOTES
Refill Authorization Note     is requesting a refill authorization.    Brief assessment and rationale for refill: APPROVE; Needs Appt  Amount/Quantity of medication ordered: 90d  Date of last appointment: 3/9/2017     Refills Authorized: Yes  If authorized number of refills: 0        Medication-related problems identified: Requires appointment  Medication Therapy Plan: HTN- commented as controlled (OVBUBBA Gaviria 7/17/18); Labs-wnl; Needs Annual  Name and strength of medication: lisinopril-hydrochlorothiazide (PRINZIDE,ZESTORETIC) 20-12.5 mg per tablet  How patient will take medication: t1t po qd  Medication reconciliation completed: No  Comments:   BP Readings from Last 3 Encounters:   07/17/18 126/74   05/18/18 (!) 140/87   03/09/17 136/78     Lab Results   Component Value Date    CREATININE 1.3 07/17/2018    BUN 29 (H) 07/17/2018     07/17/2018    K 4.6 07/17/2018     07/17/2018    CO2 25 07/17/2018

## 2018-07-25 ENCOUNTER — TELEPHONE (OUTPATIENT)
Dept: AUDIOLOGY | Facility: CLINIC | Age: 73
End: 2018-07-25

## 2018-07-25 NOTE — TELEPHONE ENCOUNTER
Spoke with patient - he lost the retention wire for one of his hearing aids. He will drop it off for another modified (shortened) retention wire to be added to the aid. He will be called when this is complete so he can come  the repaired hearing aid.     ----- Message from Shonna Liriano sent at 7/25/2018 12:44 PM CDT -----  Contact: [phone]  Patient requesting a call from [facility], stated this his 2nd time calling.  Patient was ensured he will get a call back at [facility].  Patient contact #  [phone]

## 2018-07-27 NOTE — TELEPHONE ENCOUNTER
Patient states he just saw iva hernandez and did lab work. Patient states he will think about it and call back.

## 2018-08-30 PROBLEM — D72.829 LEUKOCYTOSIS: Status: ACTIVE | Noted: 2018-08-30

## 2018-08-30 PROBLEM — R07.9 CHEST PAIN: Status: ACTIVE | Noted: 2018-08-30

## 2018-08-30 PROBLEM — I21.4 NSTEMI (NON-ST ELEVATED MYOCARDIAL INFARCTION): Status: ACTIVE | Noted: 2018-08-30

## 2018-09-12 PROBLEM — I25.10 CORONARY ARTERY DISEASE DUE TO CALCIFIED CORONARY LESION: Status: ACTIVE | Noted: 2018-09-12

## 2018-09-12 PROBLEM — I25.84 CORONARY ARTERY DISEASE DUE TO CALCIFIED CORONARY LESION: Status: ACTIVE | Noted: 2018-09-12

## 2018-10-02 ENCOUNTER — OFFICE VISIT (OUTPATIENT)
Dept: FAMILY MEDICINE | Facility: CLINIC | Age: 73
End: 2018-10-02
Payer: MEDICARE

## 2018-10-02 VITALS
BODY MASS INDEX: 28.8 KG/M2 | DIASTOLIC BLOOD PRESSURE: 70 MMHG | OXYGEN SATURATION: 97 % | SYSTOLIC BLOOD PRESSURE: 110 MMHG | HEART RATE: 75 BPM | HEIGHT: 68 IN | WEIGHT: 190.06 LBS | RESPIRATION RATE: 18 BRPM

## 2018-10-02 DIAGNOSIS — I10 ESSENTIAL HYPERTENSION: Primary | ICD-10-CM

## 2018-10-02 DIAGNOSIS — E78.00 PURE HYPERCHOLESTEROLEMIA: ICD-10-CM

## 2018-10-02 DIAGNOSIS — I21.4 NSTEMI (NON-ST ELEVATED MYOCARDIAL INFARCTION): ICD-10-CM

## 2018-10-02 PROCEDURE — 99214 OFFICE O/P EST MOD 30 MIN: CPT | Mod: S$PBB,,, | Performed by: NURSE PRACTITIONER

## 2018-10-02 PROCEDURE — 99214 OFFICE O/P EST MOD 30 MIN: CPT | Mod: PBBFAC,PO | Performed by: NURSE PRACTITIONER

## 2018-10-02 PROCEDURE — 99999 PR PBB SHADOW E&M-EST. PATIENT-LVL IV: CPT | Mod: PBBFAC,,, | Performed by: NURSE PRACTITIONER

## 2018-10-02 NOTE — PROGRESS NOTES
Subjective:       Patient ID: Omar Araujo is a 73 y.o. male.    Chief Complaint: Hospital Follow Up (Mild myocardial infarct)  He was last seen in primary care by Briana on 02/08/2017. This is his first time seeing me in the clinic.   HPI   He is here for hospital follow-up. He was admitted to [facility] on 09/12/2018 with diagnosis of NSTEMI. He had stent placement and states he has not experienced any complications or pain since the incident.States feels much better but feels like just don't have all of his strength back.   Vitals:    10/02/18 0852   BP: 110/70   Pulse: 75   Resp: 18     BP Readings from Last 3 Encounters:   10/02/18 110/70   09/12/18 124/74   08/31/18 (!) 130/50     Review of Systems   Constitutional: Negative for fatigue.   Respiratory: Negative for chest tightness and shortness of breath.    Cardiovascular: Negative for leg swelling.       He has a follow up cardiology appointment on 10/04/2018.   States he has been walking daily since home   He does not have home healthy  Denies any changes to medications since admission  Does not need any assistance devices  He lives with his wife and states she is in home with him if any problems    CMP  Sodium   Date Value Ref Range Status   09/12/2018 135 (L) 136 - 145 mmol/L Final     Potassium   Date Value Ref Range Status   09/12/2018 4.5 3.5 - 5.1 mmol/L Final     Chloride   Date Value Ref Range Status   09/12/2018 104 95 - 110 mmol/L Final     CO2   Date Value Ref Range Status   09/12/2018 23 22 - 31 mmol/L Final     Glucose   Date Value Ref Range Status   09/12/2018 104 70 - 110 mg/dL Final     Comment:     The ADA recommends the following guidelines for fasting glucose:  Normal:       less than 100 mg/dL  Prediabetes:  100 mg/dL to 125 mg/dL  Diabetes:     126 mg/dL or higher       BUN, Bld   Date Value Ref Range Status   09/12/2018 23 (H) 9 - 21 mg/dL Final     Creatinine   Date Value Ref Range Status   09/12/2018 1.12 0.50 - 1.40 mg/dL Final      Calcium   Date Value Ref Range Status   09/12/2018 9.3 8.4 - 10.2 mg/dL Final     Total Protein   Date Value Ref Range Status   08/29/2018 7.4 6.0 - 8.4 g/dL Final     Albumin   Date Value Ref Range Status   08/29/2018 4.4 3.5 - 5.2 g/dL Final     Total Bilirubin   Date Value Ref Range Status   08/29/2018 0.8 0.2 - 1.3 mg/dL Final     Alkaline Phosphatase   Date Value Ref Range Status   08/29/2018 83 38 - 145 U/L Final     AST   Date Value Ref Range Status   08/29/2018 27 17 - 59 U/L Final     ALT   Date Value Ref Range Status   08/29/2018 40 10 - 44 U/L Final     Anion Gap   Date Value Ref Range Status   09/12/2018 8 8 - 16 mmol/L Final     eGFR if    Date Value Ref Range Status   09/12/2018 >60 >60 mL/min/1.73 m^2 Final     eGFR if non    Date Value Ref Range Status   09/12/2018 >60 >60 mL/min/1.73 m^2 Final     Comment:     Calculation used to obtain the estimated glomerular filtration  rate (eGFR) is the CKD-EPI equation.        I have reviewed his discharge summary from 09/12/2018.  Medication review discussed in detail with patient, states taking all medications as ordered  Objective:      Physical Exam   Constitutional: He is oriented to person, place, and time. Vital signs are normal. He appears well-developed and well-nourished. He is active and cooperative.  Non-toxic appearance. He does not have a sickly appearance. He does not appear ill. No distress.   Appears vibrant and ambulation without any complications. Seems well today and no voiced complaints   HENT:   Head: Normocephalic and atraumatic.   Right Ear: Tympanic membrane, external ear and ear canal normal.   Left Ear: Tympanic membrane, external ear and ear canal normal.   Ears:    Nose: Nose normal.   Mouth/Throat: Uvula is midline, oropharynx is clear and moist and mucous membranes are normal.   Eyes: Lids are normal.   Neck: Trachea normal, normal range of motion, full passive range of motion without pain and  phonation normal. Neck supple.   Cardiovascular: Normal rate and regular rhythm.   Pulmonary/Chest: Effort normal and breath sounds normal.   Abdominal: Soft. Bowel sounds are normal. There is no splenomegaly or hepatomegaly. There is no tenderness.   Musculoskeletal: Normal range of motion.   Lymphadenopathy:        Head (right side): No submental, no submandibular, no tonsillar, no preauricular, no posterior auricular and no occipital adenopathy present.        Head (left side): No submental, no submandibular, no tonsillar, no preauricular, no posterior auricular and no occipital adenopathy present.     He has no cervical adenopathy.   Neurological: He is alert and oriented to person, place, and time.   Skin: Skin is warm, dry and intact.   Psychiatric: He has a normal mood and affect. His speech is normal and behavior is normal. Judgment and thought content normal. Cognition and memory are normal.   Nursing note and vitals reviewed.      Assessment & Plan:       Essential hypertension    Pure hypercholesterolemia    NSTEMI (non-ST elevated myocardial infarction)    Instructed to keep appointment with cardiologist and maintain plans to start cardiac rehab  He does live home with spouse  Denies the need for any medication refills  Does not have home health at this time and currently denies need for those services  Does not require any new DME at this time     Medication List           Accurate as of 10/2/18 11:59 PM. If you have any questions, ask your nurse or doctor.               CONTINUE taking these medications    aspirin 81 MG Chew  Take 1 tablet (81 mg total) by mouth once daily.     atorvastatin 80 MG tablet  Commonly known as:  LIPITOR  Take 1 tablet (80 mg total) by mouth once daily.     calcium citrate 250 mg calcium Tab     carvedilol 6.25 MG tablet  Commonly known as:  COREG  Take 1 tablet (6.25 mg total) by mouth 2 (two) times daily.     fish oil-omega-3 fatty acids 300-1,000 mg capsule      GLUCOSAMINE ORAL     lisinopril 10 MG tablet  Take 1 tablet (10 mg total) by mouth once daily.     lutein 6 mg Cap     ONE DAILY MULTIVITAMIN per tablet  Generic drug:  multivitamin     ticagrelor 90 mg tablet  Commonly known as:  BRILINTA  Take 1 tablet (90 mg total) by mouth 2 (two) times daily.     VITAMIN C 500 MG tablet  Generic drug:  ascorbic acid (vitamin C)     VITAMIN D3 ORAL     vitamin E 400 UNIT capsule              Follow-up in about 3 months (around 1/2/2019), or if symptoms worsen or fail to improve.

## 2018-12-24 ENCOUNTER — TELEPHONE (OUTPATIENT)
Dept: FAMILY MEDICINE | Facility: CLINIC | Age: 73
End: 2018-12-24

## 2018-12-24 NOTE — TELEPHONE ENCOUNTER
----- Message from Lara Gilliland sent at 12/24/2018 10:17 AM CST -----  Type:  Patient Returning Call    Who Called:  Patient  Who Left Message for Patient:  Marli  Does the patient know what this is regarding?:  No  Best Call Back Number:  [phone]

## 2018-12-27 ENCOUNTER — TELEPHONE (OUTPATIENT)
Dept: AUDIOLOGY | Facility: CLINIC | Age: 73
End: 2018-12-27

## 2018-12-27 NOTE — TELEPHONE ENCOUNTER
Contacted patient to let him know his left hearing aid warranty  on 2018. I asked the patient if he would like to extend the warranty for another year for $150. I let the patient know the extended warranty would only cover repair since he has already used his L&D. The patient told me he would call me back tomorrow 2018 and let me know.    I also let the patient know his right hearing aid warranty is expiring on 2019 and is not able to renewed. I asked the patient to let us know if he would like to send it off for a clean & test.

## 2019-01-03 ENCOUNTER — TELEPHONE (OUTPATIENT)
Dept: AUDIOLOGY | Facility: CLINIC | Age: 74
End: 2019-01-03

## 2019-01-03 NOTE — TELEPHONE ENCOUNTER
The patient told me he is not interested in renewing his hearing aid warranty for his left ear. ( 2018)    The patient is dropping off his right aid to be sent to [facility] for a final clean & test. The warranty will  on 2019.

## 2019-01-10 ENCOUNTER — TELEPHONE (OUTPATIENT)
Dept: AUDIOLOGY | Facility: CLINIC | Age: 74
End: 2019-01-10

## 2019-01-10 NOTE — TELEPHONE ENCOUNTER
Contacted patient to let him know his hearing aid is back from the clean & test. The patient has been instructed to return his loaner to the second floor check in desk. The patient told me he will be picking up his aid on 01/11/2019.

## 2019-01-23 ENCOUNTER — OFFICE VISIT (OUTPATIENT)
Dept: FAMILY MEDICINE | Facility: CLINIC | Age: 74
End: 2019-01-23
Payer: MEDICARE

## 2019-01-23 VITALS
WEIGHT: 187.19 LBS | HEART RATE: 68 BPM | BODY MASS INDEX: 28.37 KG/M2 | OXYGEN SATURATION: 96 % | HEIGHT: 68 IN | SYSTOLIC BLOOD PRESSURE: 120 MMHG | DIASTOLIC BLOOD PRESSURE: 64 MMHG

## 2019-01-23 DIAGNOSIS — E78.00 PURE HYPERCHOLESTEROLEMIA: ICD-10-CM

## 2019-01-23 DIAGNOSIS — I10 ESSENTIAL HYPERTENSION: Primary | ICD-10-CM

## 2019-01-23 PROBLEM — D72.829 LEUKOCYTOSIS: Status: RESOLVED | Noted: 2018-08-30 | Resolved: 2019-01-23

## 2019-01-23 PROBLEM — R07.9 CHEST PAIN: Status: RESOLVED | Noted: 2018-08-30 | Resolved: 2019-01-23

## 2019-01-23 PROBLEM — I21.4 NSTEMI (NON-ST ELEVATED MYOCARDIAL INFARCTION): Status: RESOLVED | Noted: 2018-08-30 | Resolved: 2019-01-23

## 2019-01-23 PROCEDURE — 99999 PR PBB SHADOW E&M-EST. PATIENT-LVL III: CPT | Mod: PBBFAC,,, | Performed by: FAMILY MEDICINE

## 2019-01-23 PROCEDURE — 99214 PR OFFICE/OUTPT VISIT, EST, LEVL IV, 30-39 MIN: ICD-10-PCS | Mod: S$PBB,,, | Performed by: FAMILY MEDICINE

## 2019-01-23 PROCEDURE — 99213 OFFICE O/P EST LOW 20 MIN: CPT | Mod: PBBFAC,PO | Performed by: FAMILY MEDICINE

## 2019-01-23 PROCEDURE — 99214 OFFICE O/P EST MOD 30 MIN: CPT | Mod: S$PBB,,, | Performed by: FAMILY MEDICINE

## 2019-01-23 PROCEDURE — 99999 PR PBB SHADOW E&M-EST. PATIENT-LVL III: ICD-10-PCS | Mod: PBBFAC,,, | Performed by: FAMILY MEDICINE

## 2019-01-23 NOTE — PROGRESS NOTES
Subjective:       Patient ID: Omar Araujo is a 73 y.o. male.    Chief Complaint: Follow-up (3 month f/u)    Here for f/u htn and chronic medical issues. Follows with Dr. Young for cardiology and had MI last year but feeling well. In cardiac rehab 3 times weekly.      Hypertension   This is a chronic problem. The current episode started more than 1 year ago. The problem is controlled. Pertinent negatives include no chest pain, palpitations or shortness of breath.   Hyperlipidemia   This is a chronic problem. The current episode started more than 1 year ago. The problem is controlled. Pertinent negatives include no chest pain or shortness of breath.     Review of Systems   Constitutional: Negative for chills, fatigue and fever.   Respiratory: Negative for cough, chest tightness and shortness of breath.    Cardiovascular: Negative for chest pain, palpitations and leg swelling.   Endocrine: Negative for cold intolerance and heat intolerance.   Skin: Negative for rash.   Psychiatric/Behavioral: Negative for dysphoric mood. The patient is not nervous/anxious.        Objective:      Physical Exam   Constitutional: He appears well-developed and well-nourished.   HENT:   Head: Normocephalic and atraumatic.   Cardiovascular: Normal rate, regular rhythm and normal heart sounds.   Pulmonary/Chest: Effort normal and breath sounds normal.   Psychiatric: He has a normal mood and affect.   Nursing note and vitals reviewed.      Assessment:       1. Essential hypertension    2. Pure hypercholesterolemia        Plan:       Essential hypertension    Pure hypercholesterolemia      Will monitor chronic medical issues and continue current plan of care.    Labs as planned with cardiology.   Follow-up in about 6 months (around 7/23/2019), or if symptoms worsen or fail to improve.

## 2019-06-17 ENCOUNTER — TELEPHONE (OUTPATIENT)
Dept: FAMILY MEDICINE | Facility: CLINIC | Age: 74
End: 2019-06-17

## 2019-06-17 NOTE — TELEPHONE ENCOUNTER
----- Message from Gracie Disla sent at 6/17/2019  4:13 PM CDT -----  Contact: patient  Type:  Patient Returning Call    Who Called:  patient  Who Left Message for Patient:  unknown  Does the patient know what this is regarding?:    Best Call Back Number:  [phone]  Additional Information:

## 2019-07-15 ENCOUNTER — LAB VISIT (OUTPATIENT)
Dept: LAB | Facility: HOSPITAL | Age: 74
End: 2019-07-15
Attending: INTERNAL MEDICINE
Payer: MEDICARE

## 2019-07-15 DIAGNOSIS — I25.84 CORONARY ARTERY DISEASE DUE TO CALCIFIED CORONARY LESION: ICD-10-CM

## 2019-07-15 DIAGNOSIS — I25.10 CORONARY ARTERY DISEASE DUE TO CALCIFIED CORONARY LESION: ICD-10-CM

## 2019-07-15 LAB
ALBUMIN SERPL BCP-MCNC: 3.8 G/DL (ref 3.5–5.2)
ALP SERPL-CCNC: 75 U/L (ref 55–135)
ALT SERPL W/O P-5'-P-CCNC: 26 U/L (ref 10–44)
ANION GAP SERPL CALC-SCNC: 9 MMOL/L (ref 8–16)
AST SERPL-CCNC: 19 U/L (ref 10–40)
BILIRUB SERPL-MCNC: 0.8 MG/DL (ref 0.1–1)
BUN SERPL-MCNC: 30 MG/DL (ref 8–23)
CALCIUM SERPL-MCNC: 9.7 MG/DL (ref 8.7–10.5)
CHLORIDE SERPL-SCNC: 106 MMOL/L (ref 95–110)
CHOLEST SERPL-MCNC: 120 MG/DL (ref 120–199)
CHOLEST/HDLC SERPL: 2.4 {RATIO} (ref 2–5)
CK SERPL-CCNC: 57 U/L (ref 20–200)
CO2 SERPL-SCNC: 25 MMOL/L (ref 23–29)
CREAT SERPL-MCNC: 1.1 MG/DL (ref 0.5–1.4)
EST. GFR  (AFRICAN AMERICAN): >60 ML/MIN/1.73 M^2
EST. GFR  (NON AFRICAN AMERICAN): >60 ML/MIN/1.73 M^2
GLUCOSE SERPL-MCNC: 114 MG/DL (ref 70–110)
HDLC SERPL-MCNC: 51 MG/DL (ref 40–75)
HDLC SERPL: 42.5 % (ref 20–50)
LDLC SERPL CALC-MCNC: 57.4 MG/DL (ref 63–159)
NONHDLC SERPL-MCNC: 69 MG/DL
POTASSIUM SERPL-SCNC: 5.1 MMOL/L (ref 3.5–5.1)
PROT SERPL-MCNC: 6.6 G/DL (ref 6–8.4)
SODIUM SERPL-SCNC: 140 MMOL/L (ref 136–145)
TRIGL SERPL-MCNC: 58 MG/DL (ref 30–150)

## 2019-07-15 PROCEDURE — 80053 COMPREHEN METABOLIC PANEL: CPT

## 2019-07-15 PROCEDURE — 82550 ASSAY OF CK (CPK): CPT

## 2019-07-15 PROCEDURE — 36415 COLL VENOUS BLD VENIPUNCTURE: CPT | Mod: PO

## 2019-07-15 PROCEDURE — 80061 LIPID PANEL: CPT

## 2019-08-14 ENCOUNTER — TELEPHONE (OUTPATIENT)
Dept: FAMILY MEDICINE | Facility: CLINIC | Age: 74
End: 2019-08-14

## 2019-08-15 ENCOUNTER — TELEPHONE (OUTPATIENT)
Dept: FAMILY MEDICINE | Facility: CLINIC | Age: 74
End: 2019-08-15

## 2019-08-15 NOTE — TELEPHONE ENCOUNTER
Mr. Araujo contacted and AWV scheduled for 8/26/19 at 8a per pt's choice. Pt voiced understanding of time and date of appt.

## 2019-08-15 NOTE — TELEPHONE ENCOUNTER
----- Message from Elver Calabrese sent at 8/15/2019  9:07 AM CDT -----  Contact: same  Unsuccessful IM sent to office.  Patient called in and stated he was returning a call to office but did not place a call to office & not sure what it would be about?    Patient call back number is [phone]

## 2019-08-26 ENCOUNTER — OFFICE VISIT (OUTPATIENT)
Dept: FAMILY MEDICINE | Facility: CLINIC | Age: 74
End: 2019-08-26
Payer: MEDICARE

## 2019-08-26 VITALS
HEART RATE: 62 BPM | BODY MASS INDEX: 29.3 KG/M2 | SYSTOLIC BLOOD PRESSURE: 138 MMHG | WEIGHT: 193.31 LBS | DIASTOLIC BLOOD PRESSURE: 80 MMHG | OXYGEN SATURATION: 96 % | HEIGHT: 68 IN

## 2019-08-26 DIAGNOSIS — Z00.00 ENCOUNTER FOR PREVENTIVE HEALTH EXAMINATION: Primary | ICD-10-CM

## 2019-08-26 DIAGNOSIS — I25.10 CORONARY ARTERY DISEASE DUE TO CALCIFIED CORONARY LESION: ICD-10-CM

## 2019-08-26 DIAGNOSIS — I10 ESSENTIAL HYPERTENSION: ICD-10-CM

## 2019-08-26 DIAGNOSIS — I25.84 CORONARY ARTERY DISEASE DUE TO CALCIFIED CORONARY LESION: ICD-10-CM

## 2019-08-26 DIAGNOSIS — E78.00 PURE HYPERCHOLESTEROLEMIA: ICD-10-CM

## 2019-08-26 PROCEDURE — G0439 PR MEDICARE ANNUAL WELLNESS SUBSEQUENT VISIT: ICD-10-PCS | Mod: S$GLB,,, | Performed by: NURSE PRACTITIONER

## 2019-08-26 PROCEDURE — 99215 OFFICE O/P EST HI 40 MIN: CPT | Mod: PBBFAC,PO | Performed by: NURSE PRACTITIONER

## 2019-08-26 PROCEDURE — G0439 PPPS, SUBSEQ VISIT: HCPCS | Mod: S$GLB,,, | Performed by: NURSE PRACTITIONER

## 2019-08-26 PROCEDURE — 99999 PR PBB SHADOW E&M-EST. PATIENT-LVL V: CPT | Mod: PBBFAC,,, | Performed by: NURSE PRACTITIONER

## 2019-08-26 PROCEDURE — 99999 PR PBB SHADOW E&M-EST. PATIENT-LVL V: ICD-10-PCS | Mod: PBBFAC,,, | Performed by: NURSE PRACTITIONER

## 2019-08-26 NOTE — PATIENT INSTRUCTIONS
Counseling and Referral of Other Preventative  (Italic type indicates deductible and co-insurance are waived)    Patient Name: Omar Araujo  Today's Date: 8/26/2019    Health Maintenance       Date Due Completion Date    Shingles Vaccine (1 of 2) 08/16/1995 ---    Pneumococcal Vaccine (65+ Low/Medium Risk) (2 of 2 - PPSV23) 03/09/2018 3/9/2017    Influenza Vaccine (1) 09/01/2019 12/3/2018    High Dose Statin 08/26/2020 8/26/2019    Colonoscopy 07/18/2021 7/18/2016    Lipid Panel 07/15/2024 7/15/2019    TETANUS VACCINE 07/17/2028 7/17/2018 (Declined)    Override on 7/17/2018: Declined        No orders of the defined types were placed in this encounter.    The following information is provided to all patients.  This information is to help you find resources for any of the problems found today that may be affecting your health:                Living healthy guide: www.[facility].[place].[facility]      Understanding Diabetes: www.diabetes.org      Eating healthy: www.cdc.gov/healthyweight      CDC home safety checklist: www.cdc.gov/steadi/patient.html      Agency on Aging: www.goea.[place].[facility]      Alcoholics anonymous (AA): www.aa.org      Physical Activity: www.marilin.nih.gov/yd0cnhs      Tobacco use: www.quitwithusla.org

## 2019-08-26 NOTE — PROGRESS NOTES
"Omar Araujo presented for a  Medicare AWV and comprehensive Health Risk Assessment today. The following components were reviewed and updated:    · Medical history  · Family History  · Social history  · Allergies and Current Medications  · Health Risk Assessment  · Health Maintenance  · Care Team     ** See Completed Assessments for Annual Wellness Visit within the encounter summary.**       The following assessments were completed:  · Living Situation  · CAGE  · Depression Screening  · Timed Get Up and Go  · Whisper Test  · Cognitive Function Screening          · Nutrition Screening  · ADL Screening  · PAQ Screening    Vitals:    08/26/19 0743   BP: 138/80   BP Location: Left arm   Patient Position: Sitting   BP Method: Medium (Manual)   Pulse: 62   SpO2: 96%   Weight: 87.7 kg (193 lb 5.5 oz)   Height: 5' 8" (1.727 m)     Body mass index is 29.4 kg/m².  Physical Exam   Constitutional: He is oriented to person, place, and time. He appears well-developed and well-nourished. No distress.   HENT:   Head: Normocephalic and atraumatic.   Eyes: Conjunctivae are normal.   Cardiovascular: Normal rate, regular rhythm and normal heart sounds.   Pulmonary/Chest: Effort normal and breath sounds normal.   Neurological: He is alert and oriented to person, place, and time. No cranial nerve deficit.   Skin: Skin is warm.   Psychiatric: He has a normal mood and affect.   Nursing note and vitals reviewed.        Diagnoses and health risks identified today and associated recommendations/orders:    1. Encounter for preventive health examination  Reviewed health maintenance and provided recommendations   Written rx provided for tdap, shingirx and ppsv23    2. Essential hypertension  Continue to monitor  Followed by SAVANNAH Warren MD .   Taking acei     3. Pure hypercholesterolemia  Continue to monitor  Followed by SAVANNAH Warren MD .      4. Coronary artery disease due to calcified coronary lesion  Continue to " monitor  Followed by SAVANNAH Warren MD   No CP.      Pt made sexually inappropriate comments during visit. Will see pt next year- pt needs firm boundaries    Provided Nelson with a 5-10 year written screening schedule and personal prevention plan. Recommendations were developed using the USPSTF age appropriate recommendations. Education, counseling, and referrals were provided as needed. After Visit Summary printed and given to patient which includes a list of additional screenings\tests needed.    Follow up in about 1 year (around 8/26/2020).    Kristy Muller NP  I offered to discuss end of life issues, including information on how to make advance directives that the patient could use to name someone who would make medical decisions on their behalf if they became too ill to make themselves.    ___Patient declined  _X_Patient is interested, I provided paper work and offered to discuss.

## 2019-08-30 ENCOUNTER — TELEPHONE (OUTPATIENT)
Dept: AUDIOLOGY | Facility: CLINIC | Age: 74
End: 2019-08-30

## 2019-11-07 ENCOUNTER — TELEPHONE (OUTPATIENT)
Dept: ADMINISTRATIVE | Facility: HOSPITAL | Age: 74
End: 2019-11-07

## 2019-11-07 ENCOUNTER — PATIENT OUTREACH (OUTPATIENT)
Dept: ADMINISTRATIVE | Facility: HOSPITAL | Age: 74
End: 2019-11-07

## 2019-11-07 NOTE — TELEPHONE ENCOUNTER
The patient is showing as non-compliant on the Statin Therapy Measure.       Please review if end date is accurate and if patient is currently taking the medication.     Thank You    END DATE 19    atorvastatin (LIPITOR) 80 MG tablet ()    Take 1 tablet (80 mg total) by mouth once daily.

## 2020-01-14 ENCOUNTER — PATIENT OUTREACH (OUTPATIENT)
Dept: ADMINISTRATIVE | Facility: HOSPITAL | Age: 75
End: 2020-01-14

## 2020-01-14 NOTE — PROGRESS NOTES
Health Maintenance Due   Topic Date Due    Aspirin/Antiplatelet Therapy  08/16/1963    High Dose Statin  08/16/1966    Shingles Vaccine (1 of 2) 08/16/1995    Pneumococcal Vaccine (65+ Low/Medium Risk) (2 of 2 - PPSV23) 03/09/2018     Future Appointments   Date Time Provider Department Center   1/27/2020  8:00 AM VIOLET Warren MD [facility]   1/15/2021 10:00 AM Tacos Young MD [facility]

## 2020-03-18 ENCOUNTER — CLINICAL SUPPORT (OUTPATIENT)
Dept: AUDIOLOGY | Facility: CLINIC | Age: 75
End: 2020-03-18
Payer: MEDICARE

## 2020-03-18 NOTE — PROGRESS NOTES
Patient brought right hearing aid to clinic.  He reported he has changed the battery in this aid several times, but that it still does not work.  A new size 13 battery was inserted in the aid.  The patient reported the right aid then sounded fine.  The patient will contact us as needed.

## 2020-07-17 ENCOUNTER — TELEPHONE (OUTPATIENT)
Dept: FAMILY MEDICINE | Facility: CLINIC | Age: 75
End: 2020-07-17

## 2020-07-20 ENCOUNTER — OFFICE VISIT (OUTPATIENT)
Dept: FAMILY MEDICINE | Facility: CLINIC | Age: 75
End: 2020-07-20
Payer: MEDICARE

## 2020-07-20 VITALS
DIASTOLIC BLOOD PRESSURE: 72 MMHG | HEART RATE: 67 BPM | HEIGHT: 68 IN | BODY MASS INDEX: 28.9 KG/M2 | SYSTOLIC BLOOD PRESSURE: 136 MMHG | WEIGHT: 190.69 LBS | OXYGEN SATURATION: 98 %

## 2020-07-20 DIAGNOSIS — E78.00 PURE HYPERCHOLESTEROLEMIA: ICD-10-CM

## 2020-07-20 DIAGNOSIS — I25.10 CORONARY ARTERY DISEASE DUE TO CALCIFIED CORONARY LESION: ICD-10-CM

## 2020-07-20 DIAGNOSIS — Z00.00 ENCOUNTER FOR PREVENTIVE HEALTH EXAMINATION: Primary | ICD-10-CM

## 2020-07-20 DIAGNOSIS — I25.84 CORONARY ARTERY DISEASE DUE TO CALCIFIED CORONARY LESION: ICD-10-CM

## 2020-07-20 DIAGNOSIS — I10 ESSENTIAL HYPERTENSION: ICD-10-CM

## 2020-07-20 PROCEDURE — 99213 OFFICE O/P EST LOW 20 MIN: CPT | Mod: S$GLB,ICN,, | Performed by: NURSE PRACTITIONER

## 2020-07-20 PROCEDURE — 99999 PR PBB SHADOW E&M-EST. PATIENT-LVL IV: ICD-10-PCS | Mod: PBBFAC,,, | Performed by: NURSE PRACTITIONER

## 2020-07-20 PROCEDURE — 99213 PR OFFICE/OUTPT VISIT, EST, LEVL III, 20-29 MIN: ICD-10-PCS | Mod: S$GLB,ICN,, | Performed by: NURSE PRACTITIONER

## 2020-07-20 PROCEDURE — 99214 OFFICE O/P EST MOD 30 MIN: CPT | Mod: PBBFAC,PO | Performed by: NURSE PRACTITIONER

## 2020-07-20 PROCEDURE — 99999 PR PBB SHADOW E&M-EST. PATIENT-LVL IV: CPT | Mod: PBBFAC,,, | Performed by: NURSE PRACTITIONER

## 2020-07-20 NOTE — PROGRESS NOTES
"Omar Araujo presented for a  Medicare AWV and comprehensive Health Risk Assessment today. The following components were reviewed and updated:    · Medical history  · Family History  · Social history  · Allergies and Current Medications  · Health Risk Assessment  · Health Maintenance  · Care Team     ** See Completed Assessments for Annual Wellness Visit within the encounter summary.**       The following assessments were completed:  · Living Situation  · CAGE  · Depression Screening  · Timed Get Up and Go  · Whisper Test  · Cognitive Function Screening          · Nutrition Screening  · ADL Screening  · PAQ Screening    Vitals:    07/20/20 0703   BP: 136/72   BP Location: Left arm   Patient Position: Sitting   BP Method: Medium (Manual)   Pulse: 67   SpO2: 98%   Weight: 86.5 kg (190 lb 11.2 oz)   Height: 5' 8" (1.727 m)     Body mass index is 29 kg/m².  Physical Exam  Vitals signs and nursing note reviewed.   Constitutional:       General: He is not in acute distress.     Appearance: He is well-developed.   HENT:      Head: Normocephalic and atraumatic.   Eyes:      Conjunctiva/sclera: Conjunctivae normal.   Cardiovascular:      Rate and Rhythm: Normal rate and regular rhythm.      Heart sounds: Normal heart sounds.   Pulmonary:      Effort: Pulmonary effort is normal.      Breath sounds: Normal breath sounds.   Skin:     General: Skin is warm.   Neurological:      Mental Status: He is alert and oriented to person, place, and time.      Cranial Nerves: No cranial nerve deficit.   Psychiatric:         Mood and Affect: Mood normal.         Behavior: Behavior normal.         Thought Content: Thought content normal.         Judgment: Judgment normal.           Diagnoses and health risks identified today and associated recommendations/orders:    1. Encounter for preventive health examination  Reviewed health maintenance and provided recommendations   Reports receiving one shingrix vaccine at [facility]      2. " Essential hypertension  Continue to monitor  Followed by SAVANNAH Warren MD .    - Lipid Panel; Future  - Comprehensive metabolic panel; Future  - CBC auto differential; Future    3. Pure hypercholesterolemia  Continue to monitor  Followed by SAVANNAH Warren MD .   Taking statin   - Lipid Panel; Future  - Comprehensive metabolic panel; Future  - CBC auto differential; Future    4. Coronary artery disease due to calcified coronary lesion  Continue to monitor  Followed by SAVANNAH Warren MD   No CP.    - Lipid Panel; Future  - Comprehensive metabolic panel; Future  - CBC auto differential; Future      Provided Nelson with a 5-10 year written screening schedule and personal prevention plan. Recommendations were developed using the USPSTF age appropriate recommendations. Education, counseling, and referrals were provided as needed. After Visit Summary printed and given to patient which includes a list of additional screenings\tests needed.    Follow up in one year  Kristy Muller NP  I offered to discuss end of life issues, including information on how to make advance directives that the patient could use to name someone who would make medical decisions on their behalf if they became too ill to make themselves.    ___Patient declined  _X_Patient is interested, I provided paper work and offered to discuss.

## 2020-07-20 NOTE — PATIENT INSTRUCTIONS
Counseling and Referral of Other Preventative  (Italic type indicates deductible and co-insurance are waived)    Patient Name: Omar Araujo  Today's Date: 7/20/2020    Health Maintenance       Date Due Completion Date    Aspirin/Antiplatelet Therapy 08/16/1963 ---    High Dose Statin 08/16/1966 ---    Shingles Vaccine (1 of 2) 08/16/1995 ---    Pneumococcal Vaccine (65+ Low/Medium Risk) (2 of 2 - PPSV23) 03/09/2018 3/9/2017    Influenza Vaccine (1) 09/01/2020 10/10/2019    Colorectal Cancer Screening 07/18/2021 7/18/2016    Lipid Panel 07/15/2024 7/15/2019    TETANUS VACCINE 07/17/2028 7/17/2018 (Declined)    Override on 7/17/2018: Declined        No orders of the defined types were placed in this encounter.    The following information is provided to all patients.  This information is to help you find resources for any of the problems found today that may be affecting your health:                Living healthy guide: www.[facility].[place].gov      Understanding Diabetes: www.diabetes.org      Eating healthy: www.cdc.gov/healthyweight      CDC home safety checklist: www.cdc.gov/steadi/patient.html      Agency on Aging: www.goea.[place].gov      Alcoholics anonymous (AA): www.aa.org      Physical Activity: www.marilin.nih.gov/ck3hlnj      Tobacco use: www.quitwithusla.org

## 2020-10-05 ENCOUNTER — PATIENT OUTREACH (OUTPATIENT)
Dept: ADMINISTRATIVE | Facility: HOSPITAL | Age: 75
End: 2020-10-05

## 2020-10-05 NOTE — PROGRESS NOTES
Chart review completed 10/05/2020.  Care Everywhere updates requested and reviewed.  Immunizations reconciled. Media reports reviewed.  Duplicate HM overrides and  orders removed.  Overdue HM topic chart audit and/or requested.  Overdue lab testing linked to upcoming lab appointments if applies.      Health Maintenance Due   Topic Date Due    Aspirin/Antiplatelet Therapy  1963    High Dose Statin  1966    Shingles Vaccine (1 of 2) 1995    Pneumococcal Vaccine (65+ Low/Medium Risk) (2 of 2 - PPSV23) 2018

## 2020-10-12 ENCOUNTER — LAB VISIT (OUTPATIENT)
Dept: LAB | Facility: HOSPITAL | Age: 75
End: 2020-10-12
Attending: NURSE PRACTITIONER
Payer: MEDICARE

## 2020-10-12 DIAGNOSIS — I25.10 CORONARY ARTERY DISEASE DUE TO CALCIFIED CORONARY LESION: ICD-10-CM

## 2020-10-12 DIAGNOSIS — I25.84 CORONARY ARTERY DISEASE DUE TO CALCIFIED CORONARY LESION: ICD-10-CM

## 2020-10-12 DIAGNOSIS — I10 ESSENTIAL HYPERTENSION: ICD-10-CM

## 2020-10-12 DIAGNOSIS — E78.00 PURE HYPERCHOLESTEROLEMIA: ICD-10-CM

## 2020-10-12 LAB
ALBUMIN SERPL BCP-MCNC: 3.9 G/DL (ref 3.5–5.2)
ALP SERPL-CCNC: 71 U/L (ref 55–135)
ALT SERPL W/O P-5'-P-CCNC: 28 U/L (ref 10–44)
ANION GAP SERPL CALC-SCNC: 10 MMOL/L (ref 8–16)
AST SERPL-CCNC: 20 U/L (ref 10–40)
BASOPHILS # BLD AUTO: 0.03 K/UL (ref 0–0.2)
BASOPHILS NFR BLD: 0.5 % (ref 0–1.9)
BILIRUB SERPL-MCNC: 0.9 MG/DL (ref 0.1–1)
BUN SERPL-MCNC: 24 MG/DL (ref 8–23)
CALCIUM SERPL-MCNC: 9.1 MG/DL (ref 8.7–10.5)
CHLORIDE SERPL-SCNC: 106 MMOL/L (ref 95–110)
CHOLEST SERPL-MCNC: 131 MG/DL (ref 120–199)
CHOLEST/HDLC SERPL: 2.5 {RATIO} (ref 2–5)
CO2 SERPL-SCNC: 23 MMOL/L (ref 23–29)
CREAT SERPL-MCNC: 1 MG/DL (ref 0.5–1.4)
DIFFERENTIAL METHOD: ABNORMAL
EOSINOPHIL # BLD AUTO: 0.2 K/UL (ref 0–0.5)
EOSINOPHIL NFR BLD: 2.9 % (ref 0–8)
ERYTHROCYTE [DISTWIDTH] IN BLOOD BY AUTOMATED COUNT: 12.9 % (ref 11.5–14.5)
EST. GFR  (AFRICAN AMERICAN): >60 ML/MIN/1.73 M^2
EST. GFR  (NON AFRICAN AMERICAN): >60 ML/MIN/1.73 M^2
GLUCOSE SERPL-MCNC: 114 MG/DL (ref 70–110)
HCT VFR BLD AUTO: 46.4 % (ref 40–54)
HDLC SERPL-MCNC: 52 MG/DL (ref 40–75)
HDLC SERPL: 39.7 % (ref 20–50)
HGB BLD-MCNC: 15.4 G/DL (ref 14–18)
IMM GRANULOCYTES # BLD AUTO: 0.01 K/UL (ref 0–0.04)
IMM GRANULOCYTES NFR BLD AUTO: 0.2 % (ref 0–0.5)
LDLC SERPL CALC-MCNC: 61.8 MG/DL (ref 63–159)
LYMPHOCYTES # BLD AUTO: 1.6 K/UL (ref 1–4.8)
LYMPHOCYTES NFR BLD: 27.2 % (ref 18–48)
MCH RBC QN AUTO: 34.5 PG (ref 27–31)
MCHC RBC AUTO-ENTMCNC: 33.2 G/DL (ref 32–36)
MCV RBC AUTO: 104 FL (ref 82–98)
MONOCYTES # BLD AUTO: 0.4 K/UL (ref 0.3–1)
MONOCYTES NFR BLD: 7.4 % (ref 4–15)
NEUTROPHILS # BLD AUTO: 3.7 K/UL (ref 1.8–7.7)
NEUTROPHILS NFR BLD: 61.8 % (ref 38–73)
NONHDLC SERPL-MCNC: 79 MG/DL
NRBC BLD-RTO: 0 /100 WBC
PLATELET # BLD AUTO: 142 K/UL (ref 150–350)
PMV BLD AUTO: 12.6 FL (ref 9.2–12.9)
POTASSIUM SERPL-SCNC: 4.4 MMOL/L (ref 3.5–5.1)
PROT SERPL-MCNC: 6.7 G/DL (ref 6–8.4)
RBC # BLD AUTO: 4.47 M/UL (ref 4.6–6.2)
SODIUM SERPL-SCNC: 139 MMOL/L (ref 136–145)
TRIGL SERPL-MCNC: 86 MG/DL (ref 30–150)
WBC # BLD AUTO: 5.93 K/UL (ref 3.9–12.7)

## 2020-10-12 PROCEDURE — 80061 LIPID PANEL: CPT

## 2020-10-12 PROCEDURE — 85025 COMPLETE CBC W/AUTO DIFF WBC: CPT

## 2020-10-12 PROCEDURE — 36415 COLL VENOUS BLD VENIPUNCTURE: CPT | Mod: PO

## 2020-10-12 PROCEDURE — 80053 COMPREHEN METABOLIC PANEL: CPT

## 2020-10-19 ENCOUNTER — OFFICE VISIT (OUTPATIENT)
Dept: FAMILY MEDICINE | Facility: CLINIC | Age: 75
End: 2020-10-19
Payer: MEDICARE

## 2020-10-19 VITALS
BODY MASS INDEX: 29.64 KG/M2 | WEIGHT: 195.56 LBS | OXYGEN SATURATION: 97 % | DIASTOLIC BLOOD PRESSURE: 76 MMHG | HEART RATE: 63 BPM | HEIGHT: 68 IN | SYSTOLIC BLOOD PRESSURE: 132 MMHG | TEMPERATURE: 97 F

## 2020-10-19 DIAGNOSIS — E78.5 HYPERLIPIDEMIA, UNSPECIFIED HYPERLIPIDEMIA TYPE: ICD-10-CM

## 2020-10-19 DIAGNOSIS — I10 ESSENTIAL HYPERTENSION: Primary | ICD-10-CM

## 2020-10-19 PROCEDURE — 99214 OFFICE O/P EST MOD 30 MIN: CPT | Mod: S$PBB,,, | Performed by: FAMILY MEDICINE

## 2020-10-19 PROCEDURE — 99999 PR PBB SHADOW E&M-EST. PATIENT-LVL IV: CPT | Mod: PBBFAC,,, | Performed by: FAMILY MEDICINE

## 2020-10-19 PROCEDURE — 99214 OFFICE O/P EST MOD 30 MIN: CPT | Mod: PBBFAC,PO | Performed by: FAMILY MEDICINE

## 2020-10-19 PROCEDURE — 99999 PR PBB SHADOW E&M-EST. PATIENT-LVL IV: ICD-10-PCS | Mod: PBBFAC,,, | Performed by: FAMILY MEDICINE

## 2020-10-19 PROCEDURE — 99214 PR OFFICE/OUTPT VISIT, EST, LEVL IV, 30-39 MIN: ICD-10-PCS | Mod: S$PBB,,, | Performed by: FAMILY MEDICINE

## 2020-10-19 RX ORDER — INFLUENZA A VIRUS A/MICHIGAN/45/2015 X-275 (H1N1) ANTIGEN (FORMALDEHYDE INACTIVATED), INFLUENZA A VIRUS A/SINGAPORE/INFIMH-16-0019/2016 IVR-186 (H3N2) ANTIGEN (FORMALDEHYDE INACTIVATED), INFLUENZA B VIRUS B/PHUKET/3073/2013 ANTIGEN (FORMALDEHYDE INACTIVATED), AND INFLUENZA B VIRUS B/MARYLAND/15/2016 BX-69A ANTIGEN (FORMALDEHYDE INACTIVATED) 60; 60; 60; 60 UG/.7ML; UG/.7ML; UG/.7ML; UG/.7ML
INJECTION, SUSPENSION INTRAMUSCULAR
COMMUNITY
Start: 2020-09-10

## 2020-10-19 NOTE — PROGRESS NOTES
Subjective:       Patient ID: Omar Araujo is a 75 y.o. male.    Chief Complaint: Follow-up (6 month)    Here for f/u htn and lipids. Doing well overall. Last seen by me over a year ago. In his normal state of health.     Hypertension  This is a chronic problem. The current episode started more than 1 year ago. The problem is controlled. Pertinent negatives include no palpitations or shortness of breath.   Hyperlipidemia  This is a chronic problem. The current episode started more than 1 year ago. The problem is controlled. Pertinent negatives include no shortness of breath.     Review of Systems   Respiratory: Negative for chest tightness and shortness of breath.    Cardiovascular: Negative for palpitations and leg swelling.   Endocrine: Negative for cold intolerance and heat intolerance.   Psychiatric/Behavioral: Negative for decreased concentration. The patient is not nervous/anxious.        Objective:      Physical Exam  Vitals signs and nursing note reviewed.   Constitutional:       Appearance: He is well-developed.   HENT:      Head: Normocephalic and atraumatic.   Cardiovascular:      Rate and Rhythm: Normal rate and regular rhythm.      Heart sounds: Normal heart sounds.   Pulmonary:      Effort: Pulmonary effort is normal.      Breath sounds: Normal breath sounds.         Assessment:       1. Essential hypertension    2. Hyperlipidemia, unspecified hyperlipidemia type        Plan:       Essential hypertension  -     CBC auto differential; Future; Expected date: 10/19/2020  -     Comprehensive Metabolic Panel; Future; Expected date: 10/19/2020  -     Lipid Panel; Future; Expected date: 10/19/2020  -     TSH; Future; Expected date: 10/19/2020    Hyperlipidemia, unspecified hyperlipidemia type  -     CBC auto differential; Future; Expected date: 10/19/2020  -     Comprehensive Metabolic Panel; Future; Expected date: 10/19/2020  -     Lipid Panel; Future; Expected date: 10/19/2020  -     TSH; Future;  Expected date: 10/19/2020    reviewed recent labs  htn stable  Lipid stable  Will monitor chronic medical issues and continue current plan of care.      Follow up in about 6 months (around 4/19/2021), or if symptoms worsen or fail to improve.

## 2021-01-13 ENCOUNTER — PATIENT OUTREACH (OUTPATIENT)
Dept: ADMINISTRATIVE | Facility: OTHER | Age: 76
End: 2021-01-13

## 2021-01-14 ENCOUNTER — CLINICAL SUPPORT (OUTPATIENT)
Dept: AUDIOLOGY | Facility: CLINIC | Age: 76
End: 2021-01-14
Payer: MEDICARE

## 2021-01-14 ENCOUNTER — OFFICE VISIT (OUTPATIENT)
Dept: OTOLARYNGOLOGY | Facility: CLINIC | Age: 76
End: 2021-01-14
Payer: MEDICARE

## 2021-01-14 VITALS — HEIGHT: 68 IN | BODY MASS INDEX: 29.64 KG/M2 | WEIGHT: 195.56 LBS

## 2021-01-14 DIAGNOSIS — H90.A21 SENSORINEURAL HEARING LOSS (SNHL) OF RIGHT EAR WITH RESTRICTED HEARING OF LEFT EAR: ICD-10-CM

## 2021-01-14 DIAGNOSIS — Z97.4 WEARS HEARING AID IN BOTH EARS: Primary | ICD-10-CM

## 2021-01-14 DIAGNOSIS — H61.23 BILATERAL IMPACTED CERUMEN: ICD-10-CM

## 2021-01-14 DIAGNOSIS — H90.A32 MIXED CONDUCTIVE AND SENSORINEURAL HEARING LOSS OF LEFT EAR WITH RESTRICTED HEARING OF RIGHT EAR: Primary | ICD-10-CM

## 2021-01-14 DIAGNOSIS — H90.3 ASYMMETRICAL SENSORINEURAL HEARING LOSS: ICD-10-CM

## 2021-01-14 DIAGNOSIS — H93.293 IMPAIRED AUDITORY DISCRIMINATION, BILATERAL: ICD-10-CM

## 2021-01-14 PROCEDURE — 92557 COMPREHENSIVE HEARING TEST: CPT | Mod: PBBFAC,PO | Performed by: AUDIOLOGIST-HEARING AID FITTER

## 2021-01-14 PROCEDURE — 99999 PR PBB SHADOW E&M-EST. PATIENT-LVL I: CPT | Mod: PBBFAC,,,

## 2021-01-14 PROCEDURE — 99213 PR OFFICE/OUTPT VISIT, EST, LEVL III, 20-29 MIN: ICD-10-PCS | Mod: 25,S$PBB,, | Performed by: NURSE PRACTITIONER

## 2021-01-14 PROCEDURE — 99999 PR PBB SHADOW E&M-EST. PATIENT-LVL III: ICD-10-PCS | Mod: PBBFAC,,, | Performed by: NURSE PRACTITIONER

## 2021-01-14 PROCEDURE — 99213 OFFICE O/P EST LOW 20 MIN: CPT | Mod: PBBFAC,27,PO,25 | Performed by: NURSE PRACTITIONER

## 2021-01-14 PROCEDURE — 99213 OFFICE O/P EST LOW 20 MIN: CPT | Mod: 25,S$PBB,, | Performed by: NURSE PRACTITIONER

## 2021-01-14 PROCEDURE — 99999 PR PBB SHADOW E&M-EST. PATIENT-LVL I: ICD-10-PCS | Mod: PBBFAC,,,

## 2021-01-14 PROCEDURE — 99211 OFF/OP EST MAY X REQ PHY/QHP: CPT | Mod: PBBFAC,PO

## 2021-01-14 PROCEDURE — G0268 PR REMOVAL OF IMPACTED WAX MD: ICD-10-PCS | Mod: S$PBB,,, | Performed by: NURSE PRACTITIONER

## 2021-01-14 PROCEDURE — G0268 REMOVAL OF IMPACTED WAX MD: HCPCS | Mod: PBBFAC,PO | Performed by: NURSE PRACTITIONER

## 2021-01-14 PROCEDURE — G0268 REMOVAL OF IMPACTED WAX MD: HCPCS | Mod: S$PBB,,, | Performed by: NURSE PRACTITIONER

## 2021-01-14 PROCEDURE — 99999 PR PBB SHADOW E&M-EST. PATIENT-LVL III: CPT | Mod: PBBFAC,,, | Performed by: NURSE PRACTITIONER

## 2021-01-18 ENCOUNTER — IMMUNIZATION (OUTPATIENT)
Dept: INTERNAL MEDICINE | Facility: CLINIC | Age: 76
End: 2021-01-18
Payer: MEDICARE

## 2021-01-18 DIAGNOSIS — Z23 NEED FOR VACCINATION: Primary | ICD-10-CM

## 2021-01-18 PROCEDURE — 91300 COVID-19, MRNA, LNP-S, PF, 30 MCG/0.3 ML DOSE VACCINE: CPT | Mod: PBBFAC | Performed by: FAMILY MEDICINE

## 2021-01-22 ENCOUNTER — TELEPHONE (OUTPATIENT)
Dept: FAMILY MEDICINE | Facility: CLINIC | Age: 76
End: 2021-01-22

## 2021-02-08 ENCOUNTER — IMMUNIZATION (OUTPATIENT)
Dept: INTERNAL MEDICINE | Facility: CLINIC | Age: 76
End: 2021-02-08
Payer: MEDICARE

## 2021-02-08 DIAGNOSIS — Z23 NEED FOR VACCINATION: Primary | ICD-10-CM

## 2021-02-08 PROCEDURE — 91300 COVID-19, MRNA, LNP-S, PF, 30 MCG/0.3 ML DOSE VACCINE: CPT | Mod: PBBFAC | Performed by: FAMILY MEDICINE

## 2021-02-08 PROCEDURE — 0002A COVID-19, MRNA, LNP-S, PF, 30 MCG/0.3 ML DOSE VACCINE: CPT | Mod: PBBFAC | Performed by: FAMILY MEDICINE

## 2021-03-05 ENCOUNTER — CLINICAL SUPPORT (OUTPATIENT)
Dept: AUDIOLOGY | Facility: CLINIC | Age: 76
End: 2021-03-05
Payer: MEDICARE

## 2021-03-05 PROCEDURE — V5014 HEARING AID REPAIR/MODIFYING: HCPCS | Mod: CSM,,, | Performed by: AUDIOLOGIST

## 2021-03-05 PROCEDURE — V5014 PR HEARING AID REPAIR/MODIFYING: ICD-10-PCS | Mod: CSM,,, | Performed by: AUDIOLOGIST

## 2021-04-08 ENCOUNTER — PATIENT OUTREACH (OUTPATIENT)
Dept: ADMINISTRATIVE | Facility: HOSPITAL | Age: 76
End: 2021-04-08

## 2021-04-19 ENCOUNTER — OFFICE VISIT (OUTPATIENT)
Dept: FAMILY MEDICINE | Facility: CLINIC | Age: 76
End: 2021-04-19
Payer: MEDICARE

## 2021-04-19 ENCOUNTER — HOSPITAL ENCOUNTER (OUTPATIENT)
Dept: RADIOLOGY | Facility: HOSPITAL | Age: 76
Discharge: HOME | End: 2021-04-19
Attending: FAMILY MEDICINE
Payer: MEDICARE

## 2021-04-19 VITALS
HEART RATE: 61 BPM | WEIGHT: 201.5 LBS | BODY MASS INDEX: 31.63 KG/M2 | DIASTOLIC BLOOD PRESSURE: 70 MMHG | HEIGHT: 67 IN | SYSTOLIC BLOOD PRESSURE: 124 MMHG | TEMPERATURE: 99 F | RESPIRATION RATE: 18 BRPM

## 2021-04-19 DIAGNOSIS — I25.10 CORONARY ARTERY DISEASE DUE TO CALCIFIED CORONARY LESION: ICD-10-CM

## 2021-04-19 DIAGNOSIS — M54.9 DORSALGIA, UNSPECIFIED: ICD-10-CM

## 2021-04-19 DIAGNOSIS — R73.09 ABNORMAL GLUCOSE: ICD-10-CM

## 2021-04-19 DIAGNOSIS — E78.5 HYPERLIPIDEMIA, UNSPECIFIED HYPERLIPIDEMIA TYPE: Primary | ICD-10-CM

## 2021-04-19 DIAGNOSIS — Z12.5 PROSTATE CANCER SCREENING: ICD-10-CM

## 2021-04-19 DIAGNOSIS — I25.84 CORONARY ARTERY DISEASE DUE TO CALCIFIED CORONARY LESION: ICD-10-CM

## 2021-04-19 PROCEDURE — 99214 OFFICE O/P EST MOD 30 MIN: CPT | Mod: S$PBB,,, | Performed by: FAMILY MEDICINE

## 2021-04-19 PROCEDURE — 72110 XR LUMBAR SPINE 5 VIEW WITH FLEX AND EXT: ICD-10-PCS | Mod: 26,,, | Performed by: RADIOLOGY

## 2021-04-19 PROCEDURE — 72110 X-RAY EXAM L-2 SPINE 4/>VWS: CPT | Mod: TC,FY,PO

## 2021-04-19 PROCEDURE — 99213 OFFICE O/P EST LOW 20 MIN: CPT | Mod: PBBFAC,PO,25 | Performed by: FAMILY MEDICINE

## 2021-04-19 PROCEDURE — 99999 PR PBB SHADOW E&M-EST. PATIENT-LVL III: CPT | Mod: PBBFAC,,, | Performed by: FAMILY MEDICINE

## 2021-04-19 PROCEDURE — 72110 X-RAY EXAM L-2 SPINE 4/>VWS: CPT | Mod: 26,,, | Performed by: RADIOLOGY

## 2021-04-19 PROCEDURE — 99214 PR OFFICE/OUTPT VISIT, EST, LEVL IV, 30-39 MIN: ICD-10-PCS | Mod: S$PBB,,, | Performed by: FAMILY MEDICINE

## 2021-04-19 PROCEDURE — 99999 PR PBB SHADOW E&M-EST. PATIENT-LVL III: ICD-10-PCS | Mod: PBBFAC,,, | Performed by: FAMILY MEDICINE

## 2021-04-20 ENCOUNTER — TELEPHONE (OUTPATIENT)
Dept: FAMILY MEDICINE | Facility: CLINIC | Age: 76
End: 2021-04-20

## 2021-06-03 ENCOUNTER — TELEPHONE (OUTPATIENT)
Dept: FAMILY MEDICINE | Facility: CLINIC | Age: 76
End: 2021-06-03

## 2021-06-08 ENCOUNTER — TELEPHONE (OUTPATIENT)
Dept: GASTROENTEROLOGY | Facility: CLINIC | Age: 76
End: 2021-06-08

## 2021-07-07 ENCOUNTER — CLINICAL SUPPORT (OUTPATIENT)
Dept: AUDIOLOGY | Facility: CLINIC | Age: 76
End: 2021-07-07

## 2021-07-07 PROCEDURE — V5014 PR HEARING AID REPAIR/MODIFYING: ICD-10-PCS | Mod: CSM,S$GLB,, | Performed by: AUDIOLOGIST

## 2021-07-07 PROCEDURE — V5014 HEARING AID REPAIR/MODIFYING: HCPCS | Mod: CSM,S$GLB,, | Performed by: AUDIOLOGIST

## 2021-07-07 PROCEDURE — COVTAX COVINGTON PRESCRIBED 4.25%: ICD-10-PCS | Mod: CSM,S$GLB,, | Performed by: AUDIOLOGIST

## 2021-07-07 PROCEDURE — COVTAX COVINGTON PRESCRIBED 4.25%: Mod: CSM,S$GLB,, | Performed by: AUDIOLOGIST

## 2021-07-23 ENCOUNTER — TELEPHONE (OUTPATIENT)
Dept: FAMILY MEDICINE | Facility: CLINIC | Age: 76
End: 2021-07-23

## 2021-07-29 ENCOUNTER — OFFICE VISIT (OUTPATIENT)
Dept: FAMILY MEDICINE | Facility: CLINIC | Age: 76
End: 2021-07-29
Payer: MEDICARE

## 2021-07-29 VITALS
OXYGEN SATURATION: 98 % | HEIGHT: 67 IN | BODY MASS INDEX: 31.18 KG/M2 | SYSTOLIC BLOOD PRESSURE: 130 MMHG | HEART RATE: 67 BPM | WEIGHT: 198.63 LBS | DIASTOLIC BLOOD PRESSURE: 80 MMHG

## 2021-07-29 DIAGNOSIS — Z00.00 ENCOUNTER FOR PREVENTIVE HEALTH EXAMINATION: Primary | ICD-10-CM

## 2021-07-29 DIAGNOSIS — I25.10 CORONARY ARTERY DISEASE DUE TO CALCIFIED CORONARY LESION: ICD-10-CM

## 2021-07-29 DIAGNOSIS — I65.23 BILATERAL CAROTID ARTERY STENOSIS: ICD-10-CM

## 2021-07-29 DIAGNOSIS — I25.84 CORONARY ARTERY DISEASE DUE TO CALCIFIED CORONARY LESION: ICD-10-CM

## 2021-07-29 PROCEDURE — 99214 OFFICE O/P EST MOD 30 MIN: CPT | Mod: PBBFAC,PO | Performed by: NURSE PRACTITIONER

## 2021-07-29 PROCEDURE — G0439 PPPS, SUBSEQ VISIT: HCPCS | Mod: ,,, | Performed by: NURSE PRACTITIONER

## 2021-07-29 PROCEDURE — 99999 PR PBB SHADOW E&M-EST. PATIENT-LVL IV: ICD-10-PCS | Mod: PBBFAC,,, | Performed by: NURSE PRACTITIONER

## 2021-07-29 PROCEDURE — 99999 PR PBB SHADOW E&M-EST. PATIENT-LVL IV: CPT | Mod: PBBFAC,,, | Performed by: NURSE PRACTITIONER

## 2021-07-29 PROCEDURE — G0439 PR MEDICARE ANNUAL WELLNESS SUBSEQUENT VISIT: ICD-10-PCS | Mod: ,,, | Performed by: NURSE PRACTITIONER

## 2021-08-18 ENCOUNTER — TELEPHONE (OUTPATIENT)
Dept: GASTROENTEROLOGY | Facility: CLINIC | Age: 76
End: 2021-08-18

## 2021-08-18 DIAGNOSIS — Z01.818 PRE-OP TESTING: Primary | ICD-10-CM

## 2021-08-20 ENCOUNTER — ANESTHESIA EVENT (OUTPATIENT)
Dept: ENDOSCOPY | Facility: HOSPITAL | Age: 76
End: 2021-08-20
Payer: MEDICARE

## 2021-08-20 ENCOUNTER — LAB VISIT (OUTPATIENT)
Dept: FAMILY MEDICINE | Facility: CLINIC | Age: 76
End: 2021-08-20
Payer: MEDICARE

## 2021-08-20 DIAGNOSIS — Z01.818 PRE-OP TESTING: ICD-10-CM

## 2021-08-20 PROCEDURE — U0003 INFECTIOUS AGENT DETECTION BY NUCLEIC ACID (DNA OR RNA); SEVERE ACUTE RESPIRATORY SYNDROME CORONAVIRUS 2 (SARS-COV-2) (CORONAVIRUS DISEASE [COVID-19]), AMPLIFIED PROBE TECHNIQUE, MAKING USE OF HIGH THROUGHPUT TECHNOLOGIES AS DESCRIBED BY CMS-2020-01-R: HCPCS | Performed by: INTERNAL MEDICINE

## 2021-08-20 PROCEDURE — U0005 INFEC AGEN DETEC AMPLI PROBE: HCPCS | Performed by: INTERNAL MEDICINE

## 2021-08-21 ENCOUNTER — NURSE TRIAGE (OUTPATIENT)
Dept: ADMINISTRATIVE | Facility: CLINIC | Age: 76
End: 2021-08-21

## 2021-08-21 LAB — SARS-COV-2 RNA RESP QL NAA+PROBE: NOT DETECTED

## 2021-08-22 ENCOUNTER — NURSE TRIAGE (OUTPATIENT)
Dept: ADMINISTRATIVE | Facility: CLINIC | Age: 76
End: 2021-08-22

## 2021-08-23 ENCOUNTER — HOSPITAL ENCOUNTER (OUTPATIENT)
Facility: HOSPITAL | Age: 76
Discharge: HOME | End: 2021-08-23
Attending: INTERNAL MEDICINE | Admitting: INTERNAL MEDICINE
Payer: MEDICARE

## 2021-08-23 ENCOUNTER — ANESTHESIA (OUTPATIENT)
Dept: ENDOSCOPY | Facility: HOSPITAL | Age: 76
End: 2021-08-23
Payer: MEDICARE

## 2021-08-23 DIAGNOSIS — Z86.010 HX OF COLONIC POLYPS: ICD-10-CM

## 2021-08-23 PROBLEM — Z86.0100 HX OF COLONIC POLYPS: Status: ACTIVE | Noted: 2021-08-23

## 2021-08-23 PROCEDURE — D9220A PRA ANESTHESIA: Mod: PT,CRNA,, | Performed by: NURSE ANESTHETIST, CERTIFIED REGISTERED

## 2021-08-23 PROCEDURE — 45385 COLONOSCOPY W/LESION REMOVAL: CPT | Mod: PO | Performed by: INTERNAL MEDICINE

## 2021-08-23 PROCEDURE — D9220A PRA ANESTHESIA: ICD-10-PCS | Mod: PT,CRNA,, | Performed by: NURSE ANESTHETIST, CERTIFIED REGISTERED

## 2021-08-23 PROCEDURE — D9220A PRA ANESTHESIA: Mod: PT,ANES,, | Performed by: ANESTHESIOLOGY

## 2021-08-23 PROCEDURE — 37000009 HC ANESTHESIA EA ADD 15 MINS: Mod: PO | Performed by: INTERNAL MEDICINE

## 2021-08-23 PROCEDURE — 45380 PR COLONOSCOPY,BIOPSY: ICD-10-PCS | Mod: PT,,, | Performed by: INTERNAL MEDICINE

## 2021-08-23 PROCEDURE — D9220A PRA ANESTHESIA: ICD-10-PCS | Mod: PT,ANES,, | Performed by: ANESTHESIOLOGY

## 2021-08-23 PROCEDURE — 27201089 HC SNARE, DISP (ANY): Mod: PO | Performed by: INTERNAL MEDICINE

## 2021-08-23 PROCEDURE — 88305 TISSUE EXAM BY PATHOLOGIST: ICD-10-PCS | Mod: 26,,, | Performed by: PATHOLOGY

## 2021-08-23 PROCEDURE — 45380 COLONOSCOPY AND BIOPSY: CPT | Mod: PO | Performed by: INTERNAL MEDICINE

## 2021-08-23 PROCEDURE — 25000003 PHARM REV CODE 250: Mod: PO | Performed by: NURSE ANESTHETIST, CERTIFIED REGISTERED

## 2021-08-23 PROCEDURE — 45380 COLONOSCOPY AND BIOPSY: CPT | Mod: PT,,, | Performed by: INTERNAL MEDICINE

## 2021-08-23 PROCEDURE — 88305 TISSUE EXAM BY PATHOLOGIST: CPT | Performed by: PATHOLOGY

## 2021-08-23 PROCEDURE — 63600175 PHARM REV CODE 636 W HCPCS: Mod: PO | Performed by: INTERNAL MEDICINE

## 2021-08-23 PROCEDURE — 88305 TISSUE EXAM BY PATHOLOGIST: CPT | Mod: 26,,, | Performed by: PATHOLOGY

## 2021-08-23 PROCEDURE — 63600175 PHARM REV CODE 636 W HCPCS: Mod: PO | Performed by: NURSE ANESTHETIST, CERTIFIED REGISTERED

## 2021-08-23 PROCEDURE — 37000008 HC ANESTHESIA 1ST 15 MINUTES: Mod: PO | Performed by: INTERNAL MEDICINE

## 2021-08-23 RX ORDER — SODIUM CHLORIDE, SODIUM LACTATE, POTASSIUM CHLORIDE, CALCIUM CHLORIDE 600; 310; 30; 20 MG/100ML; MG/100ML; MG/100ML; MG/100ML
INJECTION, SOLUTION INTRAVENOUS CONTINUOUS
Status: DISCONTINUED | OUTPATIENT
Start: 2021-08-23 | End: 2021-08-23 | Stop reason: HOSPADM

## 2021-08-23 RX ORDER — PROPOFOL 10 MG/ML
VIAL (ML) INTRAVENOUS CONTINUOUS PRN
Status: DISCONTINUED | OUTPATIENT
Start: 2021-08-23 | End: 2021-08-23

## 2021-08-23 RX ORDER — PROPOFOL 10 MG/ML
VIAL (ML) INTRAVENOUS
Status: DISCONTINUED | OUTPATIENT
Start: 2021-08-23 | End: 2021-08-23

## 2021-08-23 RX ORDER — LIDOCAINE HCL/PF 100 MG/5ML
SYRINGE (ML) INTRAVENOUS
Status: DISCONTINUED | OUTPATIENT
Start: 2021-08-23 | End: 2021-08-23

## 2021-08-23 RX ORDER — SODIUM CHLORIDE 0.9 % (FLUSH) 0.9 %
10 SYRINGE (ML) INJECTION
Status: DISCONTINUED | OUTPATIENT
Start: 2021-08-23 | End: 2021-08-23 | Stop reason: HOSPADM

## 2021-08-23 RX ADMIN — LIDOCAINE HYDROCHLORIDE 100 MG: 20 INJECTION, SOLUTION INTRAVENOUS at 09:08

## 2021-08-23 RX ADMIN — PROPOFOL 150 MCG/KG/MIN: 10 INJECTION, EMULSION INTRAVENOUS at 09:08

## 2021-08-23 RX ADMIN — SODIUM CHLORIDE, SODIUM LACTATE, POTASSIUM CHLORIDE, AND CALCIUM CHLORIDE: .6; .31; .03; .02 INJECTION, SOLUTION INTRAVENOUS at 08:08

## 2021-08-23 RX ADMIN — PROPOFOL 50 MG: 10 INJECTION, EMULSION INTRAVENOUS at 09:08

## 2021-08-24 VITALS
BODY MASS INDEX: 28.04 KG/M2 | WEIGHT: 185 LBS | TEMPERATURE: 98 F | OXYGEN SATURATION: 98 % | HEART RATE: 59 BPM | RESPIRATION RATE: 16 BRPM | SYSTOLIC BLOOD PRESSURE: 139 MMHG | HEIGHT: 68 IN | DIASTOLIC BLOOD PRESSURE: 85 MMHG

## 2021-08-26 LAB
FINAL PATHOLOGIC DIAGNOSIS: NORMAL
GROSS: NORMAL
Lab: NORMAL

## 2022-05-13 ENCOUNTER — OFFICE VISIT (OUTPATIENT)
Dept: FAMILY MEDICINE | Facility: CLINIC | Age: 77
End: 2022-05-13
Payer: MEDICARE

## 2022-05-13 ENCOUNTER — LAB VISIT (OUTPATIENT)
Dept: LAB | Facility: HOSPITAL | Age: 77
End: 2022-05-13
Attending: FAMILY MEDICINE
Payer: MEDICARE

## 2022-05-13 VITALS
TEMPERATURE: 98 F | BODY MASS INDEX: 29.03 KG/M2 | WEIGHT: 191.56 LBS | OXYGEN SATURATION: 98 % | HEART RATE: 63 BPM | DIASTOLIC BLOOD PRESSURE: 88 MMHG | HEIGHT: 68 IN | SYSTOLIC BLOOD PRESSURE: 120 MMHG

## 2022-05-13 DIAGNOSIS — E78.5 HYPERLIPIDEMIA, UNSPECIFIED HYPERLIPIDEMIA TYPE: ICD-10-CM

## 2022-05-13 DIAGNOSIS — R73.09 ABNORMAL GLUCOSE: ICD-10-CM

## 2022-05-13 DIAGNOSIS — I25.84 CORONARY ARTERY DISEASE DUE TO CALCIFIED CORONARY LESION: Primary | ICD-10-CM

## 2022-05-13 DIAGNOSIS — Z12.5 PROSTATE CANCER SCREENING: ICD-10-CM

## 2022-05-13 DIAGNOSIS — I25.10 CORONARY ARTERY DISEASE DUE TO CALCIFIED CORONARY LESION: Primary | ICD-10-CM

## 2022-05-13 DIAGNOSIS — I10 ESSENTIAL HYPERTENSION: ICD-10-CM

## 2022-05-13 LAB
ALBUMIN SERPL BCP-MCNC: 4 G/DL (ref 3.5–5.2)
ALP SERPL-CCNC: 84 U/L (ref 55–135)
ALT SERPL W/O P-5'-P-CCNC: 24 U/L (ref 10–44)
ANION GAP SERPL CALC-SCNC: 10 MMOL/L (ref 8–16)
AST SERPL-CCNC: 19 U/L (ref 10–40)
BASOPHILS # BLD AUTO: 0.05 K/UL (ref 0–0.2)
BASOPHILS NFR BLD: 0.8 % (ref 0–1.9)
BILIRUB SERPL-MCNC: 0.7 MG/DL (ref 0.1–1)
BUN SERPL-MCNC: 28 MG/DL (ref 8–23)
CALCIUM SERPL-MCNC: 9.9 MG/DL (ref 8.7–10.5)
CHLORIDE SERPL-SCNC: 105 MMOL/L (ref 95–110)
CHOLEST SERPL-MCNC: 125 MG/DL (ref 120–199)
CHOLEST/HDLC SERPL: 2.4 {RATIO} (ref 2–5)
CO2 SERPL-SCNC: 23 MMOL/L (ref 23–29)
COMPLEXED PSA SERPL-MCNC: 0.6 NG/ML (ref 0–4)
CREAT SERPL-MCNC: 1.1 MG/DL (ref 0.5–1.4)
DIFFERENTIAL METHOD: ABNORMAL
EOSINOPHIL # BLD AUTO: 0.2 K/UL (ref 0–0.5)
EOSINOPHIL NFR BLD: 3.2 % (ref 0–8)
ERYTHROCYTE [DISTWIDTH] IN BLOOD BY AUTOMATED COUNT: 13.1 % (ref 11.5–14.5)
EST. GFR  (AFRICAN AMERICAN): >60 ML/MIN/1.73 M^2
EST. GFR  (NON AFRICAN AMERICAN): >60 ML/MIN/1.73 M^2
ESTIMATED AVG GLUCOSE: 103 MG/DL (ref 68–131)
GLUCOSE SERPL-MCNC: 96 MG/DL (ref 70–110)
HBA1C MFR BLD: 5.2 % (ref 4–5.6)
HCT VFR BLD AUTO: 47.6 % (ref 40–54)
HDLC SERPL-MCNC: 53 MG/DL (ref 40–75)
HDLC SERPL: 42.4 % (ref 20–50)
HGB BLD-MCNC: 16 G/DL (ref 14–18)
IMM GRANULOCYTES # BLD AUTO: 0.04 K/UL (ref 0–0.04)
IMM GRANULOCYTES NFR BLD AUTO: 0.6 % (ref 0–0.5)
LDLC SERPL CALC-MCNC: 48.4 MG/DL (ref 63–159)
LYMPHOCYTES # BLD AUTO: 1.8 K/UL (ref 1–4.8)
LYMPHOCYTES NFR BLD: 28.2 % (ref 18–48)
MCH RBC QN AUTO: 34.6 PG (ref 27–31)
MCHC RBC AUTO-ENTMCNC: 33.6 G/DL (ref 32–36)
MCV RBC AUTO: 103 FL (ref 82–98)
MONOCYTES # BLD AUTO: 0.6 K/UL (ref 0.3–1)
MONOCYTES NFR BLD: 10.2 % (ref 4–15)
NEUTROPHILS # BLD AUTO: 3.6 K/UL (ref 1.8–7.7)
NEUTROPHILS NFR BLD: 57 % (ref 38–73)
NONHDLC SERPL-MCNC: 72 MG/DL
NRBC BLD-RTO: 0 /100 WBC
PLATELET # BLD AUTO: 123 K/UL (ref 150–450)
PMV BLD AUTO: 12.2 FL (ref 9.2–12.9)
POTASSIUM SERPL-SCNC: 4.5 MMOL/L (ref 3.5–5.1)
PROT SERPL-MCNC: 7 G/DL (ref 6–8.4)
RBC # BLD AUTO: 4.62 M/UL (ref 4.6–6.2)
SODIUM SERPL-SCNC: 138 MMOL/L (ref 136–145)
TRIGL SERPL-MCNC: 118 MG/DL (ref 30–150)
WBC # BLD AUTO: 6.27 K/UL (ref 3.9–12.7)

## 2022-05-13 PROCEDURE — 99999 PR PBB SHADOW E&M-EST. PATIENT-LVL IV: CPT | Mod: PBBFAC,,, | Performed by: FAMILY MEDICINE

## 2022-05-13 PROCEDURE — 99999 PR PBB SHADOW E&M-EST. PATIENT-LVL IV: ICD-10-PCS | Mod: PBBFAC,,, | Performed by: FAMILY MEDICINE

## 2022-05-13 PROCEDURE — 83036 HEMOGLOBIN GLYCOSYLATED A1C: CPT | Performed by: FAMILY MEDICINE

## 2022-05-13 PROCEDURE — 99214 OFFICE O/P EST MOD 30 MIN: CPT | Mod: S$PBB,,, | Performed by: FAMILY MEDICINE

## 2022-05-13 PROCEDURE — 36415 COLL VENOUS BLD VENIPUNCTURE: CPT | Mod: PO | Performed by: FAMILY MEDICINE

## 2022-05-13 PROCEDURE — 84153 ASSAY OF PSA TOTAL: CPT | Performed by: FAMILY MEDICINE

## 2022-05-13 PROCEDURE — 99214 PR OFFICE/OUTPT VISIT, EST, LEVL IV, 30-39 MIN: ICD-10-PCS | Mod: S$PBB,,, | Performed by: FAMILY MEDICINE

## 2022-05-13 PROCEDURE — 80053 COMPREHEN METABOLIC PANEL: CPT | Performed by: FAMILY MEDICINE

## 2022-05-13 PROCEDURE — 85025 COMPLETE CBC W/AUTO DIFF WBC: CPT | Performed by: FAMILY MEDICINE

## 2022-05-13 PROCEDURE — 80061 LIPID PANEL: CPT | Performed by: FAMILY MEDICINE

## 2022-05-13 PROCEDURE — 99214 OFFICE O/P EST MOD 30 MIN: CPT | Mod: PBBFAC,PO | Performed by: FAMILY MEDICINE

## 2022-05-13 NOTE — PROGRESS NOTES
Subjective:       Patient ID: Omar Araujo is a 76 y.o. male.    Chief Complaint: Annual Exam    Here for annual exam and to f/u on chronic health issues    CAD s/p MI, stent x 2, HTN - taking ASA 81mg, Lipitor 80mg daily, Coreg 6.25mg BID, lisinopril 10mg daily; following with cardiology, Dr. Young  Stress test pending        Past Medical History:   Diagnosis Date    Anticoagulant long-term use     Colon polyp     Coronary artery disease     Hypertension     MI (myocardial infarction)        Past Surgical History:   Procedure Laterality Date    COLONOSCOPY N/A 7/18/2016    Procedure: COLONOSCOPY;  Surgeon: Carlos Gaytan Jr., MD;  Location: [facility] ENDO;  Service: Endoscopy;  Laterality: N/A;    COLONOSCOPY N/A 8/23/2021    Procedure: COLONOSCOPY;  Surgeon: Carlos Gaytan Jr., MD;  Location: [facility] ENDO;  Service: Endoscopy;  Laterality: N/A;    COLONOSCOPY W/ POLYPECTOMY  03/11/2005    ANA.   TUBULAR ADENOMA.    COLONOSCOPY W/ POLYPECTOMY  04/07/2008    ANA.   TUBULAR ADENOMA.    COLONOSCOPY W/ POLYPECTOMY  04/18/2011    ANA.   TUBULAR ADENOMAS.    CORONARY STENT PLACEMENT N/A 9/12/2018    Procedure: PCI;  Surgeon: Tacos Young MD;  Location: [facility] CATH;  Service: Cardiovascular;  Laterality: N/A;    DENTAL SURGERY      LEFT HEART CATHETERIZATION Left 8/30/2018    Procedure: Left heart cath;  Surgeon: Tacos Young MD;  Location: [facility] CATH;  Service: Cardiovascular;  Laterality: Left;    PERCUTANEOUS TRANSLUMINAL BALLOON ANGIOPLASTY OF CORONARY ARTERY  8/30/2018    Procedure: Angioplasty-coronary;  Surgeon: Tacos Young MD;  Location: [facility] CATH;  Service: Cardiovascular;;    TYMPANOPLASTY Left 03/03/2015       Review of patient's allergies indicates:   Allergen Reactions    Iodine and iodide containing products Swelling       Social History     Socioeconomic History    Marital status:     Number of children: 1   Occupational History    Occupation: retired [employer]   Tobacco Use    Smoking status: Former Smoker    Smokeless tobacco: Never Used    Tobacco comment: quit 30 years ago   Substance and Sexual Activity    Alcohol use: Yes     Alcohol/week: 3.0 - 4.0 standard drinks     Types: 1 Cans of beer, 2 - 3 Glasses of wine per week     Comment: occasionally    Drug use: No    Sexual activity: Not Currently   Social History Narrative    Gardening, fishing         a [place]        Retired [place]       Current Outpatient Medications on File Prior to Visit   Medication Sig Dispense Refill    ascorbic acid, vitamin C, (VITAMIN C) 500 MG tablet Take 500 mg by mouth once daily.      aspirin 81 MG Chew Take 1 tablet (81 mg total) by mouth once daily.  0    atorvastatin (LIPITOR) 80 MG tablet Take 1/2 tablet (40 mg total) by mouth once daily. 45 tablet 1    calcium citrate 250 mg calcium Tab Take 1 tablet by mouth once daily.       carvediloL (COREG) 6.25 MG tablet TAKE 1 TABLET (6.25 MG TOTAL) BY MOUTH TWICE DAILY. 180 tablet 1    cholecalciferol, vitamin D3, (VITAMIN D3 ORAL) Take by mouth once daily.       fish oil-omega-3 fatty acids 300-1,000 mg capsule Take 1 g by mouth once daily.      FLUZONE HIGHDOSE QUAD 20-21  mcg/0.7 mL Syrg ADM 0.7ML IM UTD      glucosamine sulfate (GLUCOSAMINE ORAL) Take by mouth Daily.      lisinopriL 10 MG tablet Take 1 tablet (10 mg total) by mouth once daily. 90 tablet 3    lutein 6 mg Cap Take 1 capsule by mouth once daily.       multivitamin (THERAGRAN) per tablet Take 1 tablet by mouth once daily.      vitamin E 400 UNIT capsule Take 400 Units by mouth once daily.       No current facility-administered medications on file prior to visit.       Family History   Problem Relation Age of Onset    No Known Problems Mother     Heart disease Father     Breast cancer Sister     Diabetes Neg Hx     Hypertension Neg Hx     Thyroid disease Neg Hx        Review of Systems   Constitutional: Negative for appetite  "change, chills, fever and unexpected weight change.   HENT: Negative for sore throat and trouble swallowing.    Eyes: Negative for pain and visual disturbance.   Respiratory: Negative for cough, chest tightness, shortness of breath and wheezing.    Cardiovascular: Negative for chest pain, palpitations and leg swelling.   Gastrointestinal: Negative for abdominal pain, blood in stool, constipation, diarrhea and nausea.   Genitourinary: Negative for difficulty urinating, dysuria and hematuria.   Musculoskeletal: Positive for back pain. Negative for arthralgias, gait problem and neck pain.   Skin: Negative for rash and wound.   Neurological: Negative for dizziness, weakness, light-headedness and headaches.   Hematological: Negative for adenopathy.   Psychiatric/Behavioral: Negative for dysphoric mood.       Objective:      /88 (BP Location: Left arm, Patient Position: Sitting)   Pulse 63   Temp 98 °F (36.7 °C) (Oral)   Ht 5' 8" (1.727 m)   Wt 86.9 kg (191 lb 9.3 oz)   SpO2 98%   BMI 29.13 kg/m²   Physical Exam  Constitutional:       General: He is not in acute distress.     Appearance: He is well-developed.   HENT:      Head: Normocephalic and atraumatic.      Right Ear: External ear normal.      Left Ear: External ear normal.      Mouth/Throat:      Pharynx: Uvula midline. No oropharyngeal exudate.   Eyes:      General: Lids are normal.      Conjunctiva/sclera: Conjunctivae normal.      Pupils: Pupils are equal, round, and reactive to light.   Neck:      Thyroid: No thyroid mass or thyromegaly.      Trachea: Phonation normal.   Cardiovascular:      Rate and Rhythm: Normal rate and regular rhythm.      Heart sounds: Normal heart sounds. No murmur heard.    No friction rub. No gallop.   Pulmonary:      Effort: Pulmonary effort is normal. No respiratory distress.      Breath sounds: Normal breath sounds. No wheezing or rales.   Abdominal:      General: Abdomen is flat. Bowel sounds are normal.      Palpations: " "Abdomen is soft.   Musculoskeletal:         General: Normal range of motion.      Cervical back: Full passive range of motion without pain, normal range of motion and neck supple.      Lumbar back: Normal. No swelling or deformity. Normal range of motion. No scoliosis.   Lymphadenopathy:      Cervical: No cervical adenopathy.   Skin:     General: Skin is warm and dry.   Neurological:      Mental Status: He is alert and oriented to person, place, and time.      Cranial Nerves: No cranial nerve deficit.      Coordination: Coordination normal.   Psychiatric:         Speech: Speech normal.         Behavior: Behavior normal.         Thought Content: Thought content normal.         Judgment: Judgment normal.         Results for orders placed or performed during the hospital encounter of 08/23/21   Specimen to Pathology, Surgery Gastrointestinal tract   Result Value Ref Range    Final Pathologic Diagnosis Colon, cecum, polyp, biopsy:  - Tubular adenoma.       Gross       Patient ID:  [id number] Pathology ID:  [id number]  Received in 1 part  Part 1  Received in formalin labeled "cecum polyp" is a soft tan fragment of tissue  measuring 3 x 2 x 2 mm.  Dyed with hematoxylin and submitted entirely in  cassette [id number]  Grossed by Oseas Keller      Disclaimer       Unless the case is a 'gross only' or additional testing only, the final  diagnosis for each specimen is based on a microscopic examination of  appropriate tissue sections.         Assessment:       1. Coronary artery disease due to calcified coronary lesion    2. Prostate cancer screening    3. Abnormal glucose    4. Hyperlipidemia, unspecified hyperlipidemia type    5. Essential hypertension        Plan:       Coronary artery disease due to calcified coronary lesion    Prostate cancer screening  -     PSA, Screening; Future; Expected date: 05/13/2022    Abnormal glucose  -     Comprehensive Metabolic Panel; Future; Expected date: 05/13/2022  -     Hemoglobin A1C; " Future; Expected date: 05/13/2022    Hyperlipidemia, unspecified hyperlipidemia type  -     CBC Auto Differential; Future; Expected date: 05/13/2022  -     Lipid Panel; Future; Expected date: 05/13/2022    Essential hypertension        Labs   Continue present meds and cardiology follow-up  Counseled on regular exercise, maintenance of a healthy weight, balanced diet rich in fruits/vegetables and lean protein, and avoidance of unhealthy habits like smoking and excessive alcohol intake.

## 2022-05-17 ENCOUNTER — TELEPHONE (OUTPATIENT)
Dept: FAMILY MEDICINE | Facility: CLINIC | Age: 77
End: 2022-05-17
Payer: MEDICARE

## 2022-05-17 NOTE — TELEPHONE ENCOUNTER
----- Message from Sherin Belcher, Patient Care Assistant sent at 5/17/2022  8:39 AM CDT -----  Regarding: advice  Contact: pt  Type: Needs Medical Advice  Who Called:  pt   Best Call Back Number: [phone]    Additional Information: pt states she would like a callback regarding an copy of his lab results on 5/13/22. Thanks!

## 2022-08-02 ENCOUNTER — TELEPHONE (OUTPATIENT)
Dept: FAMILY MEDICINE | Facility: CLINIC | Age: 77
End: 2022-08-02
Payer: MEDICARE

## 2022-08-02 DIAGNOSIS — U07.1 COVID-19: Primary | ICD-10-CM

## 2022-08-02 NOTE — TELEPHONE ENCOUNTER
----- Message from Randa Bailey sent at 8/2/2022 12:19 PM CDT -----  Contact: [phone]  Type: Needs Medical Advice  Who Called:  Pt  Symptoms (please be specific):  Covid   How long has patient had these symptoms:  one day   Pharmacy name and phone #:    [facility] [place] [address] [place] [address] [address] [facility] [address]  Phone: [phone] Fax: [phone]      Best Call Back Number: [phone]    Additional Information: Pls call back and advise   Pt interested in paxlovid.

## 2022-08-02 NOTE — TELEPHONE ENCOUNTER
Spoke to pt informing him that Dr. Brown sent Paxlovid rx to pharmacy. Informed pt that he should stop taking the Atorvastatin rx while taking the Paxlovid. Pt verbalized understanding.

## 2022-08-26 ENCOUNTER — TELEPHONE (OUTPATIENT)
Dept: AUDIOLOGY | Facility: CLINIC | Age: 77
End: 2022-08-26
Payer: MEDICARE

## 2022-08-26 ENCOUNTER — CLINICAL SUPPORT (OUTPATIENT)
Dept: AUDIOLOGY | Facility: CLINIC | Age: 77
End: 2022-08-26

## 2022-08-26 PROCEDURE — COVTAX COVINGTON PRESCRIBED 4.25%: ICD-10-PCS | Mod: CSM,S$GLB,, | Performed by: AUDIOLOGIST

## 2022-08-26 PROCEDURE — COVTAX COVINGTON PRESCRIBED 4.25%: Mod: CSM,S$GLB,, | Performed by: AUDIOLOGIST

## 2022-08-26 PROCEDURE — V5014 PR HEARING AID REPAIR/MODIFYING: ICD-10-PCS | Mod: CSM,S$GLB,, | Performed by: AUDIOLOGIST

## 2022-08-26 PROCEDURE — V5014 HEARING AID REPAIR/MODIFYING: HCPCS | Mod: CSM,S$GLB,, | Performed by: AUDIOLOGIST

## 2022-08-26 NOTE — TELEPHONE ENCOUNTER
Contacted patient to let him know his right  was changed on his hearing aid and he can pick it up from the second floor check in desk. Patient is aware of the charge for the  since he is out of warranty.

## 2022-09-28 NOTE — TELEPHONE ENCOUNTER
----- Message from Gerry Davila sent at 7/16/2018  9:44 AM CDT -----  Contact: Pt  Name of Who is Calling: Sanju      What is the request in detail: Sanju is calling states he had an appointment for 2:40 tomorrow for clearance for his oral procedure. Pt states he needs to be seen tomorrow.      Can the clinic reply by [facility]: no      What Number to Call Back if not in [facility]: [phone] or [phone] (home)   
Called patient and scheduled appointment for dental procedure clearance.   
electronic

## 2022-10-28 ENCOUNTER — TELEPHONE (OUTPATIENT)
Dept: FAMILY MEDICINE | Facility: CLINIC | Age: 77
End: 2022-10-28
Payer: MEDICARE

## 2022-10-28 NOTE — TELEPHONE ENCOUNTER
----- Message from Marcell Rodriguez sent at 10/28/2022  9:57 AM CDT -----  Contact: Patient wife  Type:  Patient Call          Who Called: patient wife         Does the patient know what this is regarding?: requesting a call back Pt has flu symptoms and would like to have a same day appt ; please advise           Would the patient rather a call back or a response via [facility]? Call           Best Call Back Number: [phone] Natalya             Additional Information:  [place] [address] [address] [address] [facility] [address]  Phone: [phone] Fax: [phone]

## 2022-10-28 NOTE — TELEPHONE ENCOUNTER
Spoke to pt returning his call regarding symptoms. Pt states that he went to urgent care this morning and was treated.

## 2022-10-31 ENCOUNTER — PES CALL (OUTPATIENT)
Dept: ADMINISTRATIVE | Facility: CLINIC | Age: 77
End: 2022-10-31
Payer: MEDICARE

## 2022-11-03 ENCOUNTER — TELEPHONE (OUTPATIENT)
Dept: AUDIOLOGY | Facility: CLINIC | Age: 77
End: 2022-11-03
Payer: MEDICARE

## 2022-11-03 NOTE — TELEPHONE ENCOUNTER
EDIEOV letting patient know his right hearing aid is back from repair and can be picked up from the second floor check in desk.

## 2022-11-17 ENCOUNTER — CLINICAL SUPPORT (OUTPATIENT)
Dept: AUDIOLOGY | Facility: CLINIC | Age: 77
End: 2022-11-17

## 2022-11-17 PROCEDURE — V5014 PR HEARING AID REPAIR/MODIFYING: ICD-10-PCS | Mod: CSM,S$GLB,, | Performed by: AUDIOLOGIST

## 2022-11-17 PROCEDURE — COVTAX COVINGTON PRESCRIBED 4.25%: ICD-10-PCS | Mod: CSM,S$GLB,, | Performed by: AUDIOLOGIST

## 2022-11-17 PROCEDURE — COVTAX COVINGTON PRESCRIBED 4.25%: Mod: CSM,S$GLB,, | Performed by: AUDIOLOGIST

## 2022-11-17 PROCEDURE — V5014 HEARING AID REPAIR/MODIFYING: HCPCS | Mod: CSM,S$GLB,, | Performed by: AUDIOLOGIST

## 2022-11-17 NOTE — PROGRESS NOTES
Patient was not seen in the clinic. Patient paid for his right hearing aid that went off for repair. 6 month warranty included with the repair. Warranty expiration date is:05/01/2023.

## 2023-01-27 ENCOUNTER — PES CALL (OUTPATIENT)
Dept: ADMINISTRATIVE | Facility: CLINIC | Age: 78
End: 2023-01-27
Payer: MEDICARE

## 2023-02-09 ENCOUNTER — PES CALL (OUTPATIENT)
Dept: ADMINISTRATIVE | Facility: CLINIC | Age: 78
End: 2023-02-09
Payer: MEDICARE

## 2023-03-30 ENCOUNTER — TELEPHONE (OUTPATIENT)
Dept: FAMILY MEDICINE | Facility: CLINIC | Age: 78
End: 2023-03-30
Payer: MEDICARE

## 2023-03-30 NOTE — TELEPHONE ENCOUNTER
----- Message from Yaquelin Olivas sent at 3/30/2023  8:42 AM CDT -----  Who Called: pt       What is the reqeust in detail: trying to schedule a appt with wellness nurse . Please advise       Can the clinic reply by [facility]? no      Best Call Back Number: [phone]        Additional Information:

## 2023-03-30 NOTE — TELEPHONE ENCOUNTER
Pt called wanting o schedule a Wellness w/ Renzo NP. Sent message to Sandrine Kaufman MA for scheduling.

## 2023-03-31 PROBLEM — Z71.89 ADVANCE CARE PLANNING: Status: ACTIVE | Noted: 2023-03-31

## 2023-03-31 PROBLEM — I63.412 CEREBRAL INFARCTION DUE TO EMBOLISM OF LEFT MIDDLE CEREBRAL ARTERY: Status: ACTIVE | Noted: 2023-03-31

## 2023-03-31 PROBLEM — I63.9 STROKE: Status: ACTIVE | Noted: 2023-03-31

## 2023-04-10 ENCOUNTER — TELEPHONE (OUTPATIENT)
Dept: NEUROLOGY | Facility: CLINIC | Age: 78
End: 2023-04-10
Payer: MEDICARE

## 2023-04-10 NOTE — TELEPHONE ENCOUNTER
Spoke with patient regarding scheduling an appointment for a hospital f/u stroke. Patient scheduled for April 27 at 2 pm with Enma Friedman NP

## 2023-04-10 NOTE — TELEPHONE ENCOUNTER
----- Message from Arthur Velez sent at 4/10/2023  1:59 PM CDT -----  Regarding: ret call  Type:  Patient Returning Call    Who Called:  Omar    Who Left Message for Patient:  Candie    Does the patient know what this is regarding?:  Yes    Best Call Back Number:  [phone](home)     Additional Information:  Please call to advise.

## 2023-04-10 NOTE — TELEPHONE ENCOUNTER
----- Message from Marcy Lopez sent at 4/10/2023 11:52 AM CDT -----  Regarding: hospital follow up  Contact: Amarjit Gibbons  Type:  Sooner Appointment Request    Caller is requesting a sooner appointment.  Caller declined first available appointment listed below.  Caller will not accept being placed on the waitlist and is requesting a message be sent to doctor.    Name of Caller:  Amarjit phill Gibbons   When is the first available appointment?    Symptoms:  discharged 04/02/23 [facility] dx stroke  Best Call Back Number:  [phone] (home)   Additional Information:  Patient needs to be seen with in 2 weeks with Dr Garcia. Please call patient to advise.Thanks!

## 2023-04-10 NOTE — TELEPHONE ENCOUNTER
Spoke with patient, attempted to schedule a hospital f/u from a stroke, patient states that he will call back to schedule appointment due to needing to see his calendar.

## 2023-04-12 ENCOUNTER — TELEPHONE (OUTPATIENT)
Dept: NEUROLOGY | Facility: CLINIC | Age: 78
End: 2023-04-12
Payer: MEDICARE

## 2023-04-12 NOTE — TELEPHONE ENCOUNTER
----- Message from Mikel Perez sent at 4/12/2023 10:55 AM CDT -----  Contact: Self  Type: Needs Medical Advice  Who Called:  Patient    Best Call Back Number: [phone]   Additional Information:  Spoke with office 4/10, chart notes appt scheduled 4/27 at 2pm. Appt not in Epic, please advise.

## 2023-04-26 ENCOUNTER — TELEPHONE (OUTPATIENT)
Dept: NEUROLOGY | Facility: CLINIC | Age: 78
End: 2023-04-26
Payer: MEDICARE

## 2023-04-27 ENCOUNTER — OFFICE VISIT (OUTPATIENT)
Dept: NEUROLOGY | Facility: CLINIC | Age: 78
End: 2023-04-27
Payer: MEDICARE

## 2023-04-27 VITALS
BODY MASS INDEX: 28.11 KG/M2 | SYSTOLIC BLOOD PRESSURE: 154 MMHG | HEART RATE: 71 BPM | DIASTOLIC BLOOD PRESSURE: 86 MMHG | HEIGHT: 68 IN | WEIGHT: 185.5 LBS

## 2023-04-27 DIAGNOSIS — I63.412 CEREBRAL INFARCTION DUE TO EMBOLISM OF LEFT MIDDLE CEREBRAL ARTERY: Primary | ICD-10-CM

## 2023-04-27 DIAGNOSIS — I65.21 STENOSIS OF RIGHT CAROTID ARTERY: ICD-10-CM

## 2023-04-27 DIAGNOSIS — E78.00 PURE HYPERCHOLESTEROLEMIA: ICD-10-CM

## 2023-04-27 DIAGNOSIS — I10 PRIMARY HYPERTENSION: ICD-10-CM

## 2023-04-27 PROCEDURE — 99215 OFFICE O/P EST HI 40 MIN: CPT | Mod: S$PBB,,, | Performed by: NURSE PRACTITIONER

## 2023-04-27 PROCEDURE — 99215 PR OFFICE/OUTPT VISIT, EST, LEVL V, 40-54 MIN: ICD-10-PCS | Mod: S$PBB,,, | Performed by: NURSE PRACTITIONER

## 2023-04-27 PROCEDURE — 99999 PR PBB SHADOW E&M-EST. PATIENT-LVL IV: ICD-10-PCS | Mod: PBBFAC,,, | Performed by: NURSE PRACTITIONER

## 2023-04-27 PROCEDURE — 99999 PR PBB SHADOW E&M-EST. PATIENT-LVL IV: CPT | Mod: PBBFAC,,, | Performed by: NURSE PRACTITIONER

## 2023-04-27 PROCEDURE — 99214 OFFICE O/P EST MOD 30 MIN: CPT | Mod: PBBFAC,PO | Performed by: NURSE PRACTITIONER

## 2023-04-27 NOTE — PROGRESS NOTES
"  NEUROLOGY  Outpatient Follow Up    [facility]  [address], [place] [address]  [phone] (office) / [phone] (fax)    Patient Name:  Omar Araujo  :  1945  MR #:  [id number]  Acct #:  [id number]    Date of Neurology Visit: 2023  Name of Provider: CHAPINCITO Howard    Other Physicians:  Kang Brown MD (Primary Care Physician); Kang Brown MD (Referring)      Chief Complaint: Stroke      History of Present Illness (HPI):  As per EMR  3/31/81626  "Mr. Omar Araujo is a 77-year-old male that presented with acute onset aphasia that was deemed a candidate for thrombolytic therapy which was administered.  CTA was negative for LV 0.  Patient reports that symptoms rapidly improved after administration of thrombolytic.  Patient has past medical history as reported below.     Hospital/ICU Course:  Exam stable, discussed medication compliance and f/u with PCP with patient. Patient verbalized understanding and agreed to close follow up with PCP as well as neurologist. Hyperkalemia noted, no ekg changes and plan to see PCP within 1 week. Started on DAPT and f/u with neurology. Cardiology consulted to get long term rhythm monitor as outpatient."          Interval Hx 2023:  Patient is new to me  He states the day of his infarct he was speaking with a selby and couldn't suddenly couldn't get his words and began to babble. His spouse took him to the ED for stroke evaluation. He was given tenecteplase and his symptoms reported to rapidly improve.   Since his infarct he reports mild residual speech disturbance in that he continues with word finding or may say the wrong word.  He is currenlty seeing ST once a week which he believes is offering much benefit.   He is currently maintained on DAPT.   All neuro imaging discussed with the patient at length.             Past Medical, Surgical, Family & Social History:   Reviewed and updated.    Home " "Medications:     Current Outpatient Medications:     ascorbic acid, vitamin C, (VITAMIN C) 500 MG tablet, Take 500 mg by mouth once daily., Disp: , Rfl:     aspirin 81 MG Chew, Take 1 tablet (81 mg total) by mouth once daily., Disp: 360 tablet, Rfl: 0    atorvastatin (LIPITOR) 80 MG tablet, Take 1/2 tablet (40 mg total) by mouth once daily., Disp: 45 tablet, Rfl: 1    calcium citrate 250 mg calcium Tab, Take 1 tablet by mouth once daily. , Disp: , Rfl:     carvediloL (COREG) 6.25 MG tablet, TAKE 1 TABLET (6.25 MG TOTAL) BY MOUTH TWICE DAILY. (Patient taking differently: Take 6.25 mg by mouth 2 (two) times daily with meals.), Disp: 180 tablet, Rfl: 1    cholecalciferol, vitamin D3, (VITAMIN D3 ORAL), Take by mouth once daily. , Disp: , Rfl:     clopidogreL (PLAVIX) 75 mg tablet, Take 1 tablet (75 mg total) by mouth once daily., Disp: 30 tablet, Rfl: 11    fish oil-omega-3 fatty acids 300-1,000 mg capsule, Take 1 g by mouth once daily., Disp: , Rfl:     FLUZONE HIGHDOSE QUAD 20-21  mcg/0.7 mL Syrg, ADM 0.7ML IM UTD, Disp: , Rfl:     lisinopriL 10 MG tablet, TAKE 1 TABLET (10 MG TOTAL) BY MOUTH DAILY. (Patient taking differently: Take 10 mg by mouth once daily.), Disp: 60 tablet, Rfl: 2    lutein 6 mg Cap, Take 1 capsule by mouth once daily. , Disp: , Rfl:     multivitamin (THERAGRAN) per tablet, Take 1 tablet by mouth once daily., Disp: , Rfl:     vitamin E 400 UNIT capsule, Take 400 Units by mouth once daily., Disp: , Rfl:     Physical Examination:  BP (!) 154/86 (BP Location: Left arm, Patient Position: Sitting, BP Method: Medium (Automatic))   Pulse 71   Ht 5' 8" (1.727 m)   Wt 84.1 kg (185 lb 8.3 oz)   BMI 28.21 kg/m²     GENERAL:  General appearance: Well, non-toxic appearing.  No apparent distress.  Neck: supple.    MENTAL STATUS:  Alertness, attention span & concentration: normal.  Language: normal.  Orientation to self, place & time:  normal.  Memory, recent & remote: normal.  Fund of knowledge: " normal.      SPEECH:  Clear and fluent.  Follows complex commands.    CRANIAL NERVES:  Cranial Nerves II-XII were examined.  II - Visual fields: normal.  III, IV, VI: PERRL, EOMI, No ptosis, No nystagmus.  V - Facial sensation: normal.  VII - Face symmetry & mobility: normal.  VIII - Hearing: normal  IX, X - Palate: mobile & midline.  XI - Shoulder shrug: normal.  XII - Tongue protrusion: normal.      GROSS MOTOR:  Gait & station: non focal  Tone: normal.  Abnormal movements: none.  Finger-nose: normal.  Rapid alternating movements: normal.  Pronator drift: normal      MUSCLE STRENGTH:   Hand grasp:   - right:5/5   - left:5/5    RIGHT    LEFT   5 Deltoids 5   5 Biceps 5   5 Triceps 5   5 Forearm.Pr. 5        5 Iliopsoas flex    5   5 Hip Abduct 5   5 Hip Adduct 5   5 Quads 5   5 Hams 5   5 Dorsiflex 5   5 Plantar Flex 5     REFLEXES:    RIGHT Reflex   LEFT   2 Biceps 2   2 Brachiorad. 2        1 Patellar 1         SENSORY:  Light touch: Normal throughout.       Diagnostic Data Reviewed:     Component      Latest Ref Rng & Units 3/31/2023   Cholesterol      120 - 199 mg/dL 127   Triglycerides      30 - 150 mg/dL 114   HDL      40 - 75 mg/dL 46   LDL Cholesterol External      63.0 - 159.0 mg/dL 58.2 (L)   HDL/Cholesterol Ratio      20.0 - 50.0 % 36.2   Total Cholesterol/HDL Ratio      2.0 - 5.0 2.8   Non-HDL Cholesterol      mg/dL 81   Hemoglobin A1C External      0.0 - 5.6 % 5.3   Estimated Avg Glucose      68 - 131 mg/dL 105       CT Head Without Contrast 3/2023    Narrative  EXAMINATION:  CT HEAD WITHOUT CONTRAST    CLINICAL HISTORY:  Stroke, follow up;    TECHNIQUE:  Low dose axial images were obtained through the head.  Coronal and sagittal reformations were also performed. Contrast was not administered.    Automatic exposure control (AEC) was utilized for dose reduction.    Dose: 596 mGycm    COMPARISON:  03/31/2023    FINDINGS:  Ventricles of normal size and shape there is no shift of the midline noted.  There  are no extra-axial fluid collections are areas consistent with hemorrhage noted.  No masses is seen no acute infarcts are noted.  There is decreased attenuation in the periventricular white matter most consistent with chronic ischemia.  The calvarium appears intact    Impression  No acute abnormalities are seen      MRI Brain 3/2023:  Impression:     Findings are suggestive of acute infarct in the left parietal region without hemorrhagic transformation or abnormal enhancement.  Other secondary findings as above including changes of microangiopathy      CTA STROKE MULTI-PHASE 3/2023    Narrative  EXAMINATION:  CTA STROKE MULTI-PHASE    CLINICAL HISTORY:  Neuro deficit, acute, stroke suspected;    TECHNIQUE:  Multiple cross-section of the head were obtained from the thoracic inlet to the skull apex before and after the intravenous administration of 80 mL of Omnipaque 350.  Coronal and sagittal reformatted images were obtained.  An automated dose exposure technique was utilized this limits radiation does the patient.  MIP images were obtained.  NASCET criteria was utilized.    COMPARISON:  Same day    FINDINGS:  The visualized thoracic aorta is of normal caliber with scattered calcified atheromatous disease.  A triple vessel aortic arch is identified.  The great vessels are widely patent.  The common carotid arteries are widely patent.  Subtotal occlusion is identified of the right carotid bulb image number 72 of series 10.  No hemodynamically significant disease of the left carotid bulb.  The right internal carotid artery demonstrates 60% stenosis the left internal carotid artery demonstrates no significant stenosis.  Atheromatous disease of the carotid siphon portion without significant stenosis.    Symmetric arborization of the anterior middle cerebral arteries without evidence for stenosis, occlusion, aneurysm, or thrombus.  The anterior communicating artery is patent.    The vertebral arteries are patent with  symmetric appearance.  Basilar artery is widely patent.  The posterior cerebral arteries are widely patent without evidence for stenosis, occlusion, aneurysm, or thrombus.  Posterior communicating arteries are atretic.    The superficial and deep venous structures are patent.    Soft tissues of the neck are normal.  No suspicious osseous lesions.  Spondylotic changes are noted.  Visualized mediastinum is normal.  Lung apices are clear.    Impression  No evidence for stenosis, occlusion, aneurysm, or thrombus of the intracranial vasculature.      TTE 4/2023:  The left ventricle is normal in size with concentric hypertrophy and normal systolic function. The estimated ejection fraction is 65%.  Normal left ventricular diastolic function.  Normal right ventricular size with normal right ventricular systolic function.  Negative saline contrast bubble study for interatrial communication.          Assessment and Plan:  Problem List Items Addressed This Visit          Neuro    Cerebral infarction due to embolism of left middle cerebral artery - Primary    Current Assessment & Plan     Patient is a 76 y/o male that presents for hospital follow up.    - presented with aphasia; NIHSS 6   - deemed candidate for IV tenecteplase in which symptoms rapidly improved  MRI brain scan suggestive of acute left parietal infarct  CTA without LVO; right ICA stenosis 60%  TTE neg for PFO  F/u CTH without hemorrhage     Control vascular risk factors as per PCP/cards:   - Bp management   - cont HI statin; LDL at goal   - A1c at goal   - diet modification      Cont DAPT  Pt following cards for f/u CUS  Cont outpt ST   Stroke education provided              Cardiac/Vascular    HTN (hypertension)    Current Assessment & Plan     Vascular risk factor  BP management as per PCP/cards           Hyperlipidemia    Current Assessment & Plan     Vascular risk factor  LDL 58   - cont HI statin           Stenosis of right carotid artery    Current  Assessment & Plan     60% stenosis to right ICA as per CTA  F/U CUS as per Cards  Cont DAPT                      Important to note, also  has a past medical history of Anticoagulant long-term use, Colon polyp, Coronary artery disease, Hypertension, and MI (myocardial infarction).          The patient will return to clinic as needed    All questions were answered and patient is comfortable with the plan.         Thank you very much for the opportunity to assist in this patient's care.    If you have any questions or concerns, please do not hesitate to contact me at any time.      Sincerely,     CHAPINCITO Howard  [facility] [place]         I spent a total of 56 minutes on the day of the visit.This includes face to face time and non-face to face time preparing to see the patient (eg, review of tests), Obtaining and/or reviewing separately obtained history, Documenting clinical information in the electronic or other health record, Independently interpreting resultsand communicating results to the patient/family/caregiver, or Care coordination.

## 2023-04-27 NOTE — ASSESSMENT & PLAN NOTE
Patient is a 78 y/o male that presents for hospital follow up.    - presented with aphasia; NIHSS 6   - deemed candidate for IV tenecteplase in which symptoms rapidly improved  MRI brain scan suggestive of acute left parietal infarct  CTA without LVO; right ICA stenosis 60%  TTE neg for PFO  F/u CTH without hemorrhage     Control vascular risk factors as per PCP/cards:   - Bp management   - cont HI statin; LDL at goal   - A1c at goal   - diet modification      Cont DAPT  Pt following cards for f/u CUS  Cont outpt ST   Stroke education provided

## 2023-04-27 NOTE — PATIENT INSTRUCTIONS
You have been diagnosed with a stroke, or with a TIA (transient ischemic attack). Or you have been identified as having a high risk for stroke. During a stroke, blood stops flowing to part of your brain. This can damage areas in the brain that control other parts of the body. Symptoms after a stroke depend on which part of the brain has been affected.      Stroke risk factors  Once youve had a stroke, youre at greater risk for another one. Listed below are some other factors that can increase your risk for a stroke:  High blood pressure  High cholesterol  Cigarette or cigar smoking  Diabetes  Carotid or other artery disease  Atrial fibrillation, atrial flutter, or other heart disease  Not being physically active  Obesity  Certain blood disorders (such as sickle cell anemia)  Excessive alcohol use  Abuse of street drugs  Race  Gender  Family history of stroke  Diet high in salty, fried, or greasy foods    Changes in daily living  Doing your regular tasks may be difficult after youve had a stroke, but you can learn new ways to manage your daily activities. In fact, doing daily activities may help you to regain muscle strength and bring back function to affected limbs. Be patient, give yourself time to adjust, and appreciate the progress you make.    Daily activities  You may be at risk of falling. Make changes to your home to help you walk more easily. A therapist will decide if you need an assistive device to walk safely.  You may need to see an occupational therapist or physical therapist to learn new ways of doing things. For example, you may need to make adjustments when bathing or dressing:    Tips for showering or bathing  Test the water temperature with a hand or foot that was not affected by the stroke.  Use grab bars, a shower seat, a hand-held showerhead, and a long-handled brush.    Tips for getting dressed  Dress while sitting, starting with the affected side or limb.  Wear shirts that pull easily over  your head. Wear pants or skirts with elastic waistbands.  Use zippers with loops attached to the pull tabs.    Lifestyle changes  Take your medicines exactly as directed. Dont skip doses.  Begin an exercise program. Ask your provider how to get started. Also ask how much activity you should try to get on a daily or weekly basis. You can benefit from simple activities such as walking or gardening.  Limit alcohol intake. Men should have no more than 2 alcoholic drinks a day. Women should limit themselves to 1 alcoholic drink per day.  Know your cholesterol level. Follow your providers recommendations about how to keep cholesterol under control.  If you are a smoker, quit now. Join a stop-smoking program to improve your chances of success. Ask your provider about medicines or other methods to help you quit.  Learn stress management techniques to help you deal with stress in your home and work life.    Diet  Your healthcare provider will give you information on dietary changes that you may need to make, based on your situation. Your provider may recommend that you see a registered dietitian for help with diet changes. Changes may include:  Reducing fat and cholesterol intake  Reducing salt (sodium) intake, especially if you have high blood pressure  Eating more fresh vegetables and fruits  Eating more lean proteins, such as fish, poultry, and beans and peas (legumes)  Eating less red meat and processed meats  Using low-fat dairy products  Limiting vegetable oils and nut oils  Limiting sweets and processed foods such as chips, cookies, and baked goods    Follow-up care  Keep your medical appointments. Close follow-up is important to stroke rehabilitation and recovery.  Some medicines require blood tests to check for progress or problems. Keep follow-up appointments for any blood tests ordered by your providers    When to call 911  Call 911 right away if you have any of the following symptoms of stroke:  Weakness,  tingling, or loss of feeling on one side of your face or body  Sudden double vision or trouble seeing in one or both eyes  Sudden trouble talking or slurred speech  Trouble understanding others  Sudden, severe headache  Dizziness, loss of balance, or a sense of falling  Blackouts or seizures      F.A.S.T. is an easy way to remember the signs of stroke. When you see these signs, you know that you need to call 911 fast.  F.A.S.T. stands for:  F is for face drooping. One side of the face is drooping or numb. When the person smiles, the smile is uneven.  A is for arm weakness. One arm is weak or numb. When the person lifts both arms at the same time, one arm may drift downward.  S is for speech difficulty. You may notice slurred speech or trouble speaking. The person can't repeat a simple sentence correctly when asked.  T is for time to call 911. If someone shows any of these symptoms, even if they go away, call 911 immediately. Make note of the time the symptoms first appeared.     © 8445-6718 [employer]. [address] [place] [address], [place] [address]. All rights reserved. This information is not intended as a substitute for professional medical care. Always follow your healthcare professional's instructions.

## 2023-05-15 ENCOUNTER — OFFICE VISIT (OUTPATIENT)
Dept: FAMILY MEDICINE | Facility: CLINIC | Age: 78
End: 2023-05-15
Payer: MEDICARE

## 2023-05-15 VITALS
OXYGEN SATURATION: 96 % | DIASTOLIC BLOOD PRESSURE: 72 MMHG | TEMPERATURE: 98 F | WEIGHT: 191.81 LBS | RESPIRATION RATE: 18 BRPM | HEIGHT: 68 IN | HEART RATE: 78 BPM | SYSTOLIC BLOOD PRESSURE: 132 MMHG | BODY MASS INDEX: 29.07 KG/M2

## 2023-05-15 DIAGNOSIS — I63.412 CEREBRAL INFARCTION DUE TO EMBOLISM OF LEFT MIDDLE CEREBRAL ARTERY: ICD-10-CM

## 2023-05-15 DIAGNOSIS — I25.10 CORONARY ARTERY DISEASE DUE TO CALCIFIED CORONARY LESION: ICD-10-CM

## 2023-05-15 DIAGNOSIS — D69.6 THROMBOCYTOPENIA, UNSPECIFIED: ICD-10-CM

## 2023-05-15 DIAGNOSIS — I25.84 CORONARY ARTERY DISEASE DUE TO CALCIFIED CORONARY LESION: ICD-10-CM

## 2023-05-15 DIAGNOSIS — Z00.00 PREVENTATIVE HEALTH CARE: Primary | ICD-10-CM

## 2023-05-15 DIAGNOSIS — I70.0 AORTIC ATHEROSCLEROSIS: ICD-10-CM

## 2023-05-15 PROCEDURE — 99397 PER PM REEVAL EST PAT 65+ YR: CPT | Mod: GZ,S$PBB,, | Performed by: FAMILY MEDICINE

## 2023-05-15 PROCEDURE — 99397 PR PREVENTIVE VISIT,EST,65 & OVER: ICD-10-PCS | Mod: GZ,S$PBB,, | Performed by: FAMILY MEDICINE

## 2023-05-15 PROCEDURE — 99999 PR PBB SHADOW E&M-EST. PATIENT-LVL III: CPT | Mod: PBBFAC,,, | Performed by: FAMILY MEDICINE

## 2023-05-15 PROCEDURE — 99999 PR PBB SHADOW E&M-EST. PATIENT-LVL III: ICD-10-PCS | Mod: PBBFAC,,, | Performed by: FAMILY MEDICINE

## 2023-05-15 NOTE — PROGRESS NOTES
Subjective:       Patient ID: Omar Araujo is a 77 y.o. male.    Chief Complaint: Preventative Health Care (Physical with labs)    Here for annual exam and to f/u on chronic health issues    CAD s/p MI, stent x 2, HTN, s/p CVA - taking ASA 81mg, Plavix 75mg, Lipitor 80mg daily, Coreg 6.25mg BID, lisinopril 10mg daily; following with cardiology, Dr. Young    Some residual issues with word-findings since stroke.        Past Medical History:   Diagnosis Date    Anticoagulant long-term use     Colon polyp     Coronary artery disease     Hypertension     MI (myocardial infarction)     Stroke 03/21/2023       Past Surgical History:   Procedure Laterality Date    ANGIOGRAM, CAROTID N/A 5/2/2023    Procedure: Bilateral Carotid angiogram;  Surgeon: Tacos Young MD;  Location: [facility] CATH;  Service: Cardiovascular;  Laterality: N/A;    ANGIOGRAPHY  5/2/2023    Procedure: ANGIOGRAM;  Surgeon: Tacos Young MD;  Location: [facility] CATH;  Service: Cardiovascular;;    COLONOSCOPY N/A 7/18/2016    Procedure: COLONOSCOPY;  Surgeon: Carlos Gaytan Jr., MD;  Location: [facility] ENDO;  Service: Endoscopy;  Laterality: N/A;    COLONOSCOPY N/A 8/23/2021    Procedure: COLONOSCOPY;  Surgeon: Carlos Gaytan Jr., MD;  Location: [facility] ENDO;  Service: Endoscopy;  Laterality: N/A;    COLONOSCOPY W/ POLYPECTOMY  03/11/2005    ANA.   TUBULAR ADENOMA.    COLONOSCOPY W/ POLYPECTOMY  04/07/2008    ANA.   TUBULAR ADENOMA.    COLONOSCOPY W/ POLYPECTOMY  04/18/2011    ANA.   TUBULAR ADENOMAS.    CORONARY STENT PLACEMENT N/A 9/12/2018    Procedure: PCI;  Surgeon: Tacos Young MD;  [facility];  Service: Cardiovascular;  Laterality: N/A;    DENTAL SURGERY      LEFT HEART CATHETERIZATION Left 8/30/2018    Procedure: Left heart cath;  Surgeon: Tacos Young MD;  Location: [facility] CATH;  Service: Cardiovascular;  Laterality: Left;    PERCUTANEOUS TRANSLUMINAL BALLOON ANGIOPLASTY OF CORONARY ARTERY  8/30/2018     Procedure: Angioplasty-coronary;  Surgeon: Tacos Young MD;  Location: [facility];  Service: Cardiovascular;;    TYMPANOPLASTY Left 03/03/2015       Review of patient's allergies indicates:   Allergen Reactions    Iodinated contrast media Hives     Stated long time ago    Iodides     Iodine        Social History     Socioeconomic History    Marital status:     Number of children: 1   Occupational History    Occupation: retired [employer]   Tobacco Use    Smoking status: Former    Smokeless tobacco: Never    Tobacco comments:     quit 30 years ago   Substance and Sexual Activity    Alcohol use: Yes     Alcohol/week: 3.0 - 4.0 standard drinks     Types: 1 Cans of beer, 2 - 3 Glasses of wine per week     Comment: occasionally    Drug use: No    Sexual activity: Not Currently   Social History Narrative    Gardening, fishing         a [place]        Retired [place]       Current Outpatient Medications on File Prior to Visit   Medication Sig Dispense Refill    ascorbic acid, vitamin C, (VITAMIN C) 500 MG tablet Take 500 mg by mouth once daily.      aspirin 81 MG Chew Take 1 tablet (81 mg total) by mouth once daily. 360 tablet 0    atorvastatin (LIPITOR) 80 MG tablet Take 1/2 tablet (40 mg total) by mouth once daily. 45 tablet 1    calcium citrate 250 mg calcium Tab Take 1 tablet by mouth once daily.       carvediloL (COREG) 6.25 MG tablet TAKE 1 TABLET (6.25 MG TOTAL) BY MOUTH TWICE DAILY. (Patient taking differently: Take 6.25 mg by mouth 2 (two) times daily with meals.) 180 tablet 1    cholecalciferol, vitamin D3, (VITAMIN D3 ORAL) Take by mouth once daily.       clopidogreL (PLAVIX) 75 mg tablet Take 1 tablet (75 mg total) by mouth once daily. 30 tablet 11    fish oil-omega-3 fatty acids 300-1,000 mg capsule Take 1 g by mouth once daily.      lisinopriL 10 MG tablet TAKE 1 TABLET (10 MG TOTAL) BY MOUTH DAILY. (Patient taking differently: Take 10 mg by mouth once daily.) 60 tablet 2    lutein 6 mg  "Cap Take 1 capsule by mouth once daily.       multivitamin (THERAGRAN) per tablet Take 1 tablet by mouth once daily.      vitamin E 400 UNIT capsule Take 400 Units by mouth once daily.      FLUZONE HIGHDOSE QUAD 20-21  mcg/0.7 mL Syrg ADM 0.7ML IM UTD       No current facility-administered medications on file prior to visit.       Family History   Problem Relation Age of Onset    No Known Problems Mother     Heart disease Father     Breast cancer Sister     Diabetes Neg Hx     Hypertension Neg Hx     Thyroid disease Neg Hx        Review of Systems   Constitutional:  Negative for appetite change, chills, fever and unexpected weight change.   HENT:  Negative for sore throat and trouble swallowing.    Eyes:  Negative for pain and visual disturbance.   Respiratory:  Negative for cough, chest tightness, shortness of breath and wheezing.    Cardiovascular:  Negative for chest pain, palpitations and leg swelling.   Gastrointestinal:  Negative for abdominal pain, blood in stool, constipation, diarrhea and nausea.   Genitourinary:  Negative for difficulty urinating, dysuria and hematuria.   Musculoskeletal:  Positive for back pain. Negative for arthralgias, gait problem and neck pain.   Skin:  Negative for rash and wound.   Neurological:  Negative for dizziness, weakness, light-headedness and headaches.   Hematological:  Negative for adenopathy.   Psychiatric/Behavioral:  Negative for dysphoric mood.      Objective:      /72   Pulse 78   Temp 97.9 °F (36.6 °C) (Oral)   Resp 18   Ht 5' 8" (1.727 m)   Wt 87 kg (191 lb 12.8 oz)   SpO2 96%   BMI 29.16 kg/m²   Physical Exam  Constitutional:       General: He is not in acute distress.     Appearance: He is well-developed.   HENT:      Head: Normocephalic and atraumatic.      Right Ear: External ear normal.      Left Ear: External ear normal.      Mouth/Throat:      Pharynx: Uvula midline. No oropharyngeal exudate.   Eyes:      General: Lids are normal.      " Conjunctiva/sclera: Conjunctivae normal.      Pupils: Pupils are equal, round, and reactive to light.   Neck:      Thyroid: No thyroid mass or thyromegaly.      Trachea: Phonation normal.   Cardiovascular:      Rate and Rhythm: Normal rate and regular rhythm.      Heart sounds: Normal heart sounds. No murmur heard.    No friction rub. No gallop.   Pulmonary:      Effort: Pulmonary effort is normal. No respiratory distress.      Breath sounds: Normal breath sounds. No wheezing or rales.   Abdominal:      General: Abdomen is flat. Bowel sounds are normal.      Palpations: Abdomen is soft.   Musculoskeletal:         General: Normal range of motion.      Cervical back: Full passive range of motion without pain, normal range of motion and neck supple.      Lumbar back: Normal. No swelling or deformity. Normal range of motion. No scoliosis.   Lymphadenopathy:      Cervical: No cervical adenopathy.   Skin:     General: Skin is warm and dry.   Neurological:      Mental Status: He is alert and oriented to person, place, and time.      Cranial Nerves: No cranial nerve deficit.      Coordination: Coordination normal.   Psychiatric:         Speech: Speech normal.         Behavior: Behavior normal.         Thought Content: Thought content normal.         Judgment: Judgment normal.       Results for orders placed or performed during the hospital encounter of 04/14/23   CV Ultrasound Bilateral Doppler Carotid   Result Value Ref Range    Left ICA/CCA ratio 0.88     Right ICA/CCA ratio 2.40     Left Highest ICA 72.00     Left Highest      Right Highest .00     Right Highest      Right Highest EDV 31.00     LT Highest EDV 26.00     Right CCA prox sys 119 cm/s    Right CCA prox zurita 11 cm/s    Right CCA dist sys 67 cm/s    Right CCA dist zurita 22 cm/s    Right ICA prox sys 161 cm/s    Right ICA prox zurita 31 cm/s    Right ICA mid sys 115 cm/s    Right ICA mid zurita 21 cm/s    Right ICA dist sys 75 cm/s    Right ICA  dist zurita 14 cm/s    Right ECA sys 162 cm/s    Right vertebral sys 44 cm/s    Left CCA prox sys 104 cm/s    Left CCA prox zurita 23 cm/s    Left CCA dist sys 82 cm/s    Left CCA dist zurita 24 cm/s    Left ICA prox sys 57 cm/s    Left ICA prox zurita 14 cm/s    Left ICA mid sys 41 cm/s    Left ICA mid zurita 15 cm/s    Left ICA dist sys 72 cm/s    Left ICA dist zurita 26 cm/s    Left ECA sys 76 cm/s    Left vertebral sys 48 cm/s    Left CCA mid sys 76 cm/s    Left CCA mid zurita 19 cm/s    Right CCA mid sys 71 cm/s    Right CCA mid zurita 18 cm/s    Right vertebral zurita 14 cm/s    Right ECA zurita 39 cm/s    Left vertebral zurita 15 cm/s    Left ECA zurita 14 cm/s    Left CBA sys 68 cm/s    Left CBA zurita 17 cm/s    Rigth CBA sys 79 cm/s    Right CBA zurita 22 cm/s     Hospital labs reviewed    Assessment:       1. Preventative health care    2. Coronary artery disease due to calcified coronary lesion    3. Thrombocytopenia, unspecified    4. Aortic atherosclerosis    5. Cerebral infarction due to embolism of left middle cerebral artery          Plan:       Preventative health care    Coronary artery disease due to calcified coronary lesion    Thrombocytopenia, unspecified    Aortic atherosclerosis    Cerebral infarction due to embolism of left middle cerebral artery          Overall doing well  Continue present meds and cardiology follow-up  Counseled on regular exercise, maintenance of a healthy weight, balanced diet rich in fruits/vegetables and lean protein, and avoidance of unhealthy habits like smoking and excessive alcohol intake.  F/u annually or PRN

## 2023-06-02 ENCOUNTER — PES CALL (OUTPATIENT)
Dept: ADMINISTRATIVE | Facility: CLINIC | Age: 78
End: 2023-06-02
Payer: MEDICARE

## 2023-06-23 NOTE — PROGRESS NOTES
Extended patients hearing aid warranty for the right aid only (left aid still in warranty through 11/2018) after obtaining verbal consent from patient. Pt not seen in the clinic. Charge for hearing aid warranty renewal billed to patient's account.   
no pain, swelling or deformity of joints

## 2023-07-25 ENCOUNTER — TELEPHONE (OUTPATIENT)
Dept: FAMILY MEDICINE | Facility: CLINIC | Age: 78
End: 2023-07-25
Payer: MEDICARE

## 2023-07-25 NOTE — TELEPHONE ENCOUNTER
----- Message from Marzena Costello sent at 7/25/2023 11:12 AM CDT -----  Type:  Sooner Appointment Request    Caller is requesting a sooner appointment.  Caller declined first available appointment listed below.  Caller will not accept being placed on the waitlist and is requesting a message be sent to doctor.    Name of Caller:  pt  When is the first available appointment?  N/a   Symptoms:  f/u   Best Call Back Number:  [phone]    Additional Information:  please advise

## 2023-08-07 PROBLEM — I63.9 STROKE: Status: RESOLVED | Noted: 2023-03-31 | Resolved: 2023-08-07

## 2023-09-12 ENCOUNTER — TELEPHONE (OUTPATIENT)
Dept: FAMILY MEDICINE | Facility: CLINIC | Age: 78
End: 2023-09-12
Payer: MEDICARE

## 2023-09-14 ENCOUNTER — OFFICE VISIT (OUTPATIENT)
Dept: FAMILY MEDICINE | Facility: CLINIC | Age: 78
End: 2023-09-14
Payer: MEDICARE

## 2023-09-14 VITALS
BODY MASS INDEX: 30.04 KG/M2 | OXYGEN SATURATION: 98 % | HEART RATE: 61 BPM | HEIGHT: 68 IN | SYSTOLIC BLOOD PRESSURE: 138 MMHG | DIASTOLIC BLOOD PRESSURE: 80 MMHG | WEIGHT: 198.19 LBS

## 2023-09-14 DIAGNOSIS — I65.23 BILATERAL CAROTID ARTERY STENOSIS: ICD-10-CM

## 2023-09-14 DIAGNOSIS — Z00.00 ENCOUNTER FOR PREVENTIVE HEALTH EXAMINATION: Primary | ICD-10-CM

## 2023-09-14 DIAGNOSIS — I10 PRIMARY HYPERTENSION: ICD-10-CM

## 2023-09-14 DIAGNOSIS — I70.0 AORTIC ATHEROSCLEROSIS: ICD-10-CM

## 2023-09-14 DIAGNOSIS — E78.00 PURE HYPERCHOLESTEROLEMIA: ICD-10-CM

## 2023-09-14 DIAGNOSIS — D69.6 THROMBOCYTOPENIA, UNSPECIFIED: ICD-10-CM

## 2023-09-14 PROCEDURE — 99999 PR PBB SHADOW E&M-EST. PATIENT-LVL IV: CPT | Mod: PBBFAC,,, | Performed by: NURSE PRACTITIONER

## 2023-09-14 PROCEDURE — 99214 OFFICE O/P EST MOD 30 MIN: CPT | Mod: PBBFAC,PO | Performed by: NURSE PRACTITIONER

## 2023-09-14 PROCEDURE — G0439 PPPS, SUBSEQ VISIT: HCPCS | Mod: ,,, | Performed by: NURSE PRACTITIONER

## 2023-09-14 PROCEDURE — G0439 PR MEDICARE ANNUAL WELLNESS SUBSEQUENT VISIT: ICD-10-PCS | Mod: ,,, | Performed by: NURSE PRACTITIONER

## 2023-09-14 PROCEDURE — 99999 PR PBB SHADOW E&M-EST. PATIENT-LVL IV: ICD-10-PCS | Mod: PBBFAC,,, | Performed by: NURSE PRACTITIONER

## 2023-09-14 NOTE — PATIENT INSTRUCTIONS
Counseling and Referral of Other Preventative  (Italic type indicates deductible and co-insurance are waived)    Patient Name: Omar Araujo  Today's Date: 9/14/2023    Health Maintenance       Date Due Completion Date    COVID-19 Vaccine (5 - Pfizer series) 06/02/2022 4/7/2022    Influenza Vaccine (1) 09/01/2023 11/8/2021    Lipid Panel 03/31/2024 3/31/2023    Colonoscopy 08/23/2026 8/23/2021    TETANUS VACCINE 06/22/2030 6/22/2020    Override on 7/17/2018: Declined        No orders of the defined types were placed in this encounter.      The following information is provided to all patients.  This information is to help you find resources for any of the problems found today that may be affecting your health:                Living healthy guide: www.[facility].[place].gov      Understanding Diabetes: www.diabetes.org      Eating healthy: www.cdc.gov/healthyweight      CDC home safety checklist: www.cdc.gov/steadi/patient.html      Agency on Aging: www.goea.[place].[facility]      Alcoholics anonymous (AA): www.aa.org      Physical Activity: www.marilin.nih.gov/rn6xzkh      Tobacco use: www.quitwithusla.org

## 2023-09-14 NOTE — PROGRESS NOTES
"Omar Araujo presented for a  Medicare AWV and comprehensive Health Risk Assessment today. The following components were reviewed and updated:    Medical history  Family History  Social history  Allergies and Current Medications  Health Risk Assessment  Health Maintenance  Care Team         ** See Completed Assessments for Annual Wellness Visit within the encounter summary.**         The following assessments were completed:  Living Situation  CAGE  Depression Screening  Timed Get Up and Go  Whisper Test  Cognitive Function Screening      Nutrition Screening  ADL Screening  PAQ Screening    Review for Opioid Screening: Patient does not have rx for Opioids.    Review for Substance Use Disorders: Patient does not use substance.     Vitals:    09/14/23 0717   BP: 138/80   BP Location: Left arm   Patient Position: Sitting   BP Method: Medium (Manual)   Pulse: 61   SpO2: 98%   Weight: 89.9 kg (198 lb 3.1 oz)   Height: 5' 8" (1.727 m)     Body mass index is 30.14 kg/m².  Physical Exam  Vitals reviewed.   Constitutional:       General: He is not in acute distress.  HENT:      Head: Normocephalic.   Cardiovascular:      Rate and Rhythm: Normal rate.   Pulmonary:      Effort: Pulmonary effort is normal. No respiratory distress.   Skin:     General: Skin is warm.   Neurological:      Mental Status: He is alert.   Psychiatric:         Mood and Affect: Mood normal.               Diagnoses and health risks identified today and associated recommendations/orders:    1. Encounter for preventive health examination  Reviewed health maintenance and provided recommendations   Recommend flu vaccine  All additional health maintenance is UTD     2. Aortic atherosclerosis  Continue to monitor  Followed by Dr Young  Lumbar XR 4/19/21.      3. Thrombocytopenia, unspecified  Continue to monitor  Followed by Kang Brown MD .      4. Bilateral carotid artery stenosis  Continue to monitor  Followed by Dr Young.      5. Pure " hypercholesterolemia  Continue to monitor  Followed by Kang Brown MD .      6. Primary hypertension  Continue to monitor  Followed by Kang Brown MD .        Provided Nelson with a 5-10 year written screening schedule and personal prevention plan. Recommendations were developed using the USPSTF age appropriate recommendations. Education, counseling, and referrals were provided as needed. After Visit Summary printed and given to patient which includes a list of additional screenings\tests needed.    Follow up in one year    ETELVINA Sparrow offered to discuss advanced care planning, including how to pick a person who would make decisions for you if you were unable to make them for yourself, called a health care power of , and what kind of decisions you might make such as use of life sustaining treatments such as ventilators and tube feeding when faced with a life limiting illness recorded on a living will that they will need to know. (How you want to be cared for as you near the end of your natural life)     X Patient is interested in learning more about how to make advanced directives.  I provided them paperwork and offered to discuss this with them.

## 2024-01-31 ENCOUNTER — TELEPHONE (OUTPATIENT)
Dept: GASTROENTEROLOGY | Facility: CLINIC | Age: 79
End: 2024-01-31
Payer: MEDICARE

## 2024-01-31 NOTE — TELEPHONE ENCOUNTER
I reach out back to patient. Patient does not answer. I leave a brief voicemail for pt to return my call. Callback number is provided. Pt will not be due for a colonoscopy until 2026.

## 2024-01-31 NOTE — TELEPHONE ENCOUNTER
I call pt back to inform he isn't due for colonoscopy till 2026.   He isn't having any GI issues.

## 2024-01-31 NOTE — TELEPHONE ENCOUNTER
----- Message from Graciela Lopez LPN sent at 1/31/2024  9:00 AM CST -----  Contact: Patient    ----- Message -----  From: Brook Lazcano  Sent: 1/31/2024   8:57 AM CST  To: Lukas LOPEZ Jr. Staff    Type:  Sooner Appointment Request    Caller is requesting a sooner appointment.  Caller declined first available appointment listed below.  Caller will not accept being placed on the waitlist and is requesting a message be sent to doctor.    Name of Caller:  Patient  When is the first available appointment?  N/A    Would the patient rather a call back or a response via [facility]?   Call back  Best Call Back Number:  [phone]    Additional Information:   States he would like to speak with someone about scheduling a colonoscopy - please call - thank you

## 2024-05-24 ENCOUNTER — TELEPHONE (OUTPATIENT)
Dept: FAMILY MEDICINE | Facility: CLINIC | Age: 79
End: 2024-05-24
Payer: MEDICARE

## 2024-05-24 NOTE — TELEPHONE ENCOUNTER
Pt stated that he had physical therapy once before and had stop going. Pt also stated that he has been evaluated one before due to him having cerebral infarction due to embolism of left middle cerebral artery.

## 2024-05-24 NOTE — TELEPHONE ENCOUNTER
Last PT referral on file was 3 years ago. He would need to be reevaluated in the office to determine need and have a valid reason for insurance to cover the referral.

## 2024-05-24 NOTE — TELEPHONE ENCOUNTER
----- Message from Tootie Garcia sent at 5/24/2024  7:59 AM CDT -----  Type: Needs Medical Advice  Who Called:  pt  Symptoms (please be specific):    How long has patient had these symptoms:    Pharmacy name and phone #:    Best Call Back Number: [phone]    Additional Information: pt is asking for the nurse to call him.  He is looking for orders for physical therapy.  Please call back to advise

## 2024-05-28 ENCOUNTER — OFFICE VISIT (OUTPATIENT)
Dept: FAMILY MEDICINE | Facility: CLINIC | Age: 79
End: 2024-05-28
Payer: MEDICARE

## 2024-05-28 ENCOUNTER — TELEPHONE (OUTPATIENT)
Dept: FAMILY MEDICINE | Facility: CLINIC | Age: 79
End: 2024-05-28

## 2024-05-28 VITALS
HEART RATE: 66 BPM | DIASTOLIC BLOOD PRESSURE: 80 MMHG | SYSTOLIC BLOOD PRESSURE: 138 MMHG | OXYGEN SATURATION: 96 % | WEIGHT: 199.19 LBS | BODY MASS INDEX: 30.19 KG/M2 | HEIGHT: 68 IN

## 2024-05-28 DIAGNOSIS — M51.36 DDD (DEGENERATIVE DISC DISEASE), LUMBAR: Primary | ICD-10-CM

## 2024-05-28 DIAGNOSIS — Z86.73 HISTORY OF CVA (CEREBROVASCULAR ACCIDENT): ICD-10-CM

## 2024-05-28 PROBLEM — I63.412 CEREBRAL INFARCTION DUE TO EMBOLISM OF LEFT MIDDLE CEREBRAL ARTERY: Status: RESOLVED | Noted: 2023-03-31 | Resolved: 2024-05-28

## 2024-05-28 PROBLEM — M51.369 DDD (DEGENERATIVE DISC DISEASE), LUMBAR: Status: ACTIVE | Noted: 2024-05-28

## 2024-05-28 PROBLEM — Z86.0100 HX OF COLONIC POLYPS: Status: RESOLVED | Noted: 2021-08-23 | Resolved: 2024-05-28

## 2024-05-28 PROBLEM — Z86.010 HX OF COLONIC POLYPS: Status: RESOLVED | Noted: 2021-08-23 | Resolved: 2024-05-28

## 2024-05-28 PROBLEM — Z71.89 ADVANCE CARE PLANNING: Status: RESOLVED | Noted: 2023-03-31 | Resolved: 2024-05-28

## 2024-05-28 PROCEDURE — 99215 OFFICE O/P EST HI 40 MIN: CPT | Mod: PBBFAC,PO | Performed by: PHYSICIAN ASSISTANT

## 2024-05-28 PROCEDURE — 99999 PR PBB SHADOW E&M-EST. PATIENT-LVL V: CPT | Mod: PBBFAC,,, | Performed by: PHYSICIAN ASSISTANT

## 2024-05-28 PROCEDURE — 99214 OFFICE O/P EST MOD 30 MIN: CPT | Mod: S$PBB,,, | Performed by: PHYSICIAN ASSISTANT

## 2024-05-28 NOTE — TELEPHONE ENCOUNTER
----- Message from Jose A Graves MA sent at 5/28/2024  1:07 PM CDT -----  Regarding: FW: PT Orders  Are you able to refer him here?  ----- Message -----  From: Margot Mullins  Sent: 5/28/2024  11:32 AM CDT  To: Tay Lakhani Staff  Subject: PT Orders                                        Pt would like the orders for PT to go to [place].   Can someone please contact pt @[phone] to let him know the status.   Thank you

## 2024-05-28 NOTE — PROGRESS NOTES
Subjective     Patient ID: Omar Araujo is a 78 y.o. male.    Chief Complaint: Referral      HPI      Pt is new to me, PCP Dr. Brown.     Pt is a 78 year old male with CVA hx, hTN, HLD, CAD, carotid stenosis bilaterally, hx of thrombocytopenia, colon polyps. He presents today complaining of flare of chronic low back pain which has been bothering him for about a year, recently worsening. No radicular pain, numbness, paresthesias, weakness. Pain is aching and across lower back. No midline pain. Has l spine x ray from 2021 showing lumbar DDD with disc bulging and neuroforaminal stenosis/osteophyte formation. Last time he did PT for back was about 1.5 years ago.       Review of Systems   All other systems reviewed and are negative.         Objective     Physical Exam  Constitutional:       General: He is not in acute distress.     Appearance: Normal appearance. He is not ill-appearing, toxic-appearing or diaphoretic.   HENT:      Head: Normocephalic and atraumatic.   Cardiovascular:      Heart sounds: Normal heart sounds.   Pulmonary:      Effort: No respiratory distress.      Breath sounds: Normal breath sounds.   Musculoskeletal:      Right lower leg: No edema.      Left lower leg: No edema.      Comments: Limited ROM of L spine, no midline bony tenderness. Negative SLR bilaterally. Normal strength and sensation of LE   Neurological:      General: No focal deficit present.      Mental Status: He is alert and oriented to person, place, and time.   Psychiatric:         Mood and Affect: Mood normal.         Behavior: Behavior normal.         Thought Content: Thought content normal.         Judgment: Judgment normal.       1. DDD (degenerative disc disease), lumbar  -tylenol prn for pain, heat to affected area, encouraged stretching and ROM  - Ambulatory referral/consult to Physical/Occupational Therapy; Future    2. History of CVA (cerebrovascular accident)  -on asa and plavix, is aware to avoid  nsaids      RTC/ER precautions given. F/U if symptoms persist or worsen. If so, consider pain management referral    Lesvia Cross PA-C

## 2024-05-28 NOTE — PATIENT INSTRUCTIONS
A few reminders from today:    Tylenol as needed for back pain  Heat to affected muscles of back, this will help loosen them. Try stretching and range of motion after heat, when the muscles are loose.   Schedule physical therapy    Follow up with me if needed.   Please go to ER/urgent care if after hours or symptoms persist/worsen.     Do not hesitate to get in touch with me should you have any further questions.     Thank you for trusting me with your care!  I wish you health and happiness.    -Lesvia Cross PA-C

## 2024-09-06 PROBLEM — R94.39 ABNORMAL STRESS TEST: Status: ACTIVE | Noted: 2024-09-06

## 2024-09-17 PROBLEM — R94.39 ABNORMAL STRESS TEST: Status: RESOLVED | Noted: 2024-09-06 | Resolved: 2024-09-17

## 2025-02-24 DIAGNOSIS — Z00.00 ENCOUNTER FOR MEDICARE ANNUAL WELLNESS EXAM: ICD-10-CM

## 2025-06-11 ENCOUNTER — TELEPHONE (OUTPATIENT)
Dept: FAMILY MEDICINE | Facility: CLINIC | Age: 80
End: 2025-06-11
Payer: MEDICARE

## 2025-06-11 NOTE — TELEPHONE ENCOUNTER
Copied from CRM #[id number]. Topic: Appointments - Appointment Access  >> Jun 11, 2025 10:03 AM Carine wrote:  Type:  Sooner Apoointment Request    Caller is requesting a sooner appointment.  Caller declined first available appointment listed below.  Caller will not accept being placed on the waitlist and is requesting a message be sent to doctor.  Name of Caller:pt   When is the first available appointment?none     Would the patient rather a call back or a response via [facility]? Call Back   Best Call Back Number:[phone]    Additional Information: Pt would like to schedule a AWV with NP Kristy Muller

## 2025-06-13 ENCOUNTER — OFFICE VISIT (OUTPATIENT)
Dept: FAMILY MEDICINE | Facility: CLINIC | Age: 80
End: 2025-06-13
Payer: MEDICARE

## 2025-06-13 VITALS
DIASTOLIC BLOOD PRESSURE: 72 MMHG | BODY MASS INDEX: 30.47 KG/M2 | HEART RATE: 74 BPM | OXYGEN SATURATION: 97 % | SYSTOLIC BLOOD PRESSURE: 126 MMHG | HEIGHT: 68 IN | WEIGHT: 201.06 LBS

## 2025-06-13 DIAGNOSIS — I25.10 CORONARY ARTERY DISEASE, UNSPECIFIED VESSEL OR LESION TYPE, UNSPECIFIED WHETHER ANGINA PRESENT, UNSPECIFIED WHETHER NATIVE OR TRANSPLANTED HEART: ICD-10-CM

## 2025-06-13 DIAGNOSIS — I10 PRIMARY HYPERTENSION: ICD-10-CM

## 2025-06-13 DIAGNOSIS — I70.0 AORTIC ATHEROSCLEROSIS: ICD-10-CM

## 2025-06-13 DIAGNOSIS — I65.23 BILATERAL CAROTID ARTERY STENOSIS: ICD-10-CM

## 2025-06-13 DIAGNOSIS — D69.6 THROMBOCYTOPENIA, UNSPECIFIED: ICD-10-CM

## 2025-06-13 DIAGNOSIS — Z00.00 ENCOUNTER FOR MEDICARE ANNUAL WELLNESS EXAM: Primary | ICD-10-CM

## 2025-06-13 DIAGNOSIS — E78.2 MIXED HYPERLIPIDEMIA: ICD-10-CM

## 2025-06-13 PROCEDURE — 99215 OFFICE O/P EST HI 40 MIN: CPT | Mod: PBBFAC,PO | Performed by: NURSE PRACTITIONER

## 2025-06-13 PROCEDURE — 99999 PR PBB SHADOW E&M-EST. PATIENT-LVL V: CPT | Mod: PBBFAC,,, | Performed by: NURSE PRACTITIONER

## 2025-06-13 NOTE — PATIENT INSTRUCTIONS
Counseling and Referral of Other Preventative  (Italic type indicates deductible and co-insurance are waived)    Patient Name: Omar Araujo  Today's Date: 6/13/2025    Health Maintenance       Date Due Completion Date    RSV Vaccine (Age 60+ and Pregnant patients) (1 - 1-dose 75+ series) Never done ---    Lipid Panel 03/31/2024 3/31/2023    Colonoscopy 08/23/2026 8/23/2021    TETANUS VACCINE 06/22/2030 6/22/2020    Override on 7/17/2018: Declined        No orders of the defined types were placed in this encounter.      The following information is provided to all patients.  This information is to help you find resources for any of the problems found today that may be affecting your health:                  Living healthy guide: www.[facility].[place].gov      Understanding Diabetes: www.diabetes.org      Eating healthy: www.cdc.gov/healthyweight      [facility] home safety checklist: www.cdc.gov/steadi/patient.html      Agency on Aging: www.goea.[place].gov      Alcoholics anonymous (AA): www.aa.org      Physical Activity: www.marilin.nih.gov/oz5nxea      Tobacco use: www.quitwithusla.org

## 2025-06-13 NOTE — PROGRESS NOTES
"  Omar Araujo presented for an initial Medicare AWV today. The following components were reviewed and updated:    Medical history  Family History  Social history  Allergies and Current Medications  Health Risk Assessment  Health Maintenance  Care Team    **See Completed Assessments for Annual Wellness visit with in the encounter summary    The following assessments were completed:  Depression Screening  Cognitive function Screening    Timed Get Up Test  Whisper Test    Opioid documentation:  Patient does not have a current opioid prescription.        Vitals:    06/13/25 1049   BP: 126/72   BP Location: Left arm   Patient Position: Sitting   Pulse: 74   SpO2: 97%   Weight: 91.2 kg (201 lb 1 oz)   Height: 5' 8" (1.727 m)     Body mass index is 30.57 kg/m².       Physical Exam  Vitals reviewed.   Constitutional:       General: He is not in acute distress.  Pulmonary:      Effort: Pulmonary effort is normal. No respiratory distress.   Neurological:      Mental Status: He is alert and oriented to person, place, and time.   Psychiatric:         Mood and Affect: Mood normal.         Behavior: Behavior normal.         Thought Content: Thought content normal.         Judgment: Judgment normal.       Diagnoses and health risks identified today and associated recommendations/orders:  1. Encounter for Medicare annual wellness exam  Reviewed and discussed health maintenance.      2. Aortic atherosclerosis  Stable- continue current treatment and follow up routinely with PCP and cardiology ()  Htn and hld controlled  On a statin      3. Bilateral carotid artery stenosis  Stable- continue current treatment and follow up routinely with PCP and cardiology ()  Htn and hld controlled  On a statin      4. Coronary artery disease, unspecified vessel or lesion type, unspecified whether angina present, unspecified whether native or transplanted heart  Stable- continue current treatment and follow up routinely with " PCP and cardiology ()  Lipitor 40mg daily, coreg 6.25mg bid, plavix 75mg daily, lisinopril 10mg daily and asa daily    5. Primary hypertension  Stable- continue current treatment and follow up routinely with PCP and cardiology ()  Lipitor 40mg daily, coreg 6.25mg bid, lisinopril 10mg daily   Vitals:    06/13/25 1049   BP: 126/72   Pulse: 74      6. Mixed hyperlipidemia  Stable- continue current treatment and follow up routinely with PCP and cardiology ()  Lipitor 40mg daily and omega 3   Lab Results   Component Value Date    CHOL 127 03/31/2023    CHOL 125 05/13/2022    CHOL 122 04/19/2021      Lab Results   Component Value Date    HDL 46 03/31/2023    HDL 53 05/13/2022    HDL 51 04/19/2021     Lab Results   Component Value Date    LDLCALC 58.2 (L) 03/31/2023    LDLCALC 48.4 (L) 05/13/2022    LDLCALC 57.6 (L) 04/19/2021      Lab Results   Component Value Date    TRIG 114 03/31/2023    TRIG 118 05/13/2022    TRIG 67 04/19/2021     Lab Results   Component Value Date    TOTALCHOLEST 2.8 03/31/2023    TOTALCHOLEST 2.4 05/13/2022    TOTALCHOLEST 2.4 04/19/2021     Lab Results   Component Value Date    NONHDLCHOL 81 03/31/2023    NONHDLCHOL 72 05/13/2022    NONHDLCHOL 71 04/19/2021     Lab Results   Component Value Date    CHOLHDL 36.2 03/31/2023    CHOLHDL 42.4 05/13/2022    CHOLHDL 41.8 04/19/2021      7. Thrombocytopenia, unspecified  Stable- continue current treatment and follow up routinely with PCP   Lab Results   Component Value Date    WBC 5.73 09/03/2024    HGB 15.3 09/03/2024    HCT 45.3 09/03/2024     (H) 09/03/2024     (L) 09/03/2024     Provided Nelson with a 5-10 year written screening schedule and personal prevention plan. Recommendations were developed using the USPSTF age appropriate recommendations. Education, counseling, and referrals were provided as needed.  After Visit Summary printed and given to patient which includes a list of additional screenings\tests  needed.    I offered to discuss advanced care planning, including how to pick a person who would make decisions for you if you were unable to make them for yourself, called a health care power of , and what kind of decisions you might make such as use of life sustaining treatments such as ventilators and tube feeding when faced with a life limiting illness recorded on a living will that they will need to know. (How you want to be cared for as you near the end of your natural life)  Patient declined a discussion regarding advance directives at this time. He will complete independently with his family   Kelsea Jain, JESSY

## 2025-07-14 ENCOUNTER — LAB VISIT (OUTPATIENT)
Dept: LAB | Facility: HOSPITAL | Age: 80
End: 2025-07-14
Attending: FAMILY MEDICINE
Payer: MEDICARE

## 2025-07-14 DIAGNOSIS — I25.10 CORONARY ARTERY DISEASE INVOLVING NATIVE CORONARY ARTERY OF NATIVE HEART WITHOUT ANGINA PECTORIS: ICD-10-CM

## 2025-07-14 DIAGNOSIS — D69.6 THROMBOCYTOPENIA, UNSPECIFIED: ICD-10-CM

## 2025-07-14 DIAGNOSIS — I65.23 BILATERAL CAROTID ARTERY STENOSIS: ICD-10-CM

## 2025-07-14 DIAGNOSIS — I10 PRIMARY HYPERTENSION: ICD-10-CM

## 2025-07-14 DIAGNOSIS — Z00.00 ENCOUNTER FOR MEDICARE ANNUAL WELLNESS EXAM: ICD-10-CM

## 2025-07-14 DIAGNOSIS — E78.2 MIXED HYPERLIPIDEMIA: ICD-10-CM

## 2025-07-14 DIAGNOSIS — I70.0 AORTIC ATHEROSCLEROSIS: ICD-10-CM

## 2025-07-14 DIAGNOSIS — I25.10 CORONARY ARTERY DISEASE, UNSPECIFIED VESSEL OR LESION TYPE, UNSPECIFIED WHETHER ANGINA PRESENT, UNSPECIFIED WHETHER NATIVE OR TRANSPLANTED HEART: ICD-10-CM

## 2025-07-14 LAB
ABSOLUTE EOSINOPHIL (OHS): 0.23 K/UL
ABSOLUTE MONOCYTE (OHS): 0.5 K/UL (ref 0.3–1)
ABSOLUTE NEUTROPHIL COUNT (OHS): 4.14 K/UL (ref 1.8–7.7)
ALBUMIN SERPL BCP-MCNC: 4.2 G/DL (ref 3.5–5.2)
ALP SERPL-CCNC: 67 UNIT/L (ref 40–150)
ALT SERPL W/O P-5'-P-CCNC: 25 UNIT/L (ref 10–44)
ANION GAP (OHS): 9 MMOL/L (ref 8–16)
AST SERPL-CCNC: 17 UNIT/L (ref 11–45)
BASOPHILS # BLD AUTO: 0.04 K/UL
BASOPHILS NFR BLD AUTO: 0.6 %
BILIRUB SERPL-MCNC: 0.8 MG/DL (ref 0.1–1)
BUN SERPL-MCNC: 23 MG/DL (ref 8–23)
CALCIUM SERPL-MCNC: 9.2 MG/DL (ref 8.7–10.5)
CHLORIDE SERPL-SCNC: 108 MMOL/L (ref 95–110)
CHOLEST SERPL-MCNC: 130 MG/DL (ref 120–199)
CHOLEST/HDLC SERPL: 2.8 {RATIO} (ref 2–5)
CK SERPL-CCNC: 758 U/L (ref 20–200)
CO2 SERPL-SCNC: 24 MMOL/L (ref 23–29)
CREAT SERPL-MCNC: 1.1 MG/DL (ref 0.5–1.4)
ERYTHROCYTE [DISTWIDTH] IN BLOOD BY AUTOMATED COUNT: 12.9 % (ref 11.5–14.5)
GFR SERPLBLD CREATININE-BSD FMLA CKD-EPI: >60 ML/MIN/1.73/M2
GLUCOSE SERPL-MCNC: 127 MG/DL (ref 70–110)
HCT VFR BLD AUTO: 46.1 % (ref 40–54)
HDLC SERPL-MCNC: 47 MG/DL (ref 40–75)
HDLC SERPL: 36.2 % (ref 20–50)
HGB BLD-MCNC: 15.8 GM/DL (ref 14–18)
IMM GRANULOCYTES # BLD AUTO: 0.01 K/UL (ref 0–0.04)
IMM GRANULOCYTES NFR BLD AUTO: 0.2 % (ref 0–0.5)
LDLC SERPL CALC-MCNC: 62.6 MG/DL (ref 63–159)
LYMPHOCYTES # BLD AUTO: 1.36 K/UL (ref 1–4.8)
MCH RBC QN AUTO: 35 PG (ref 27–31)
MCHC RBC AUTO-ENTMCNC: 34.3 G/DL (ref 32–36)
MCV RBC AUTO: 102 FL (ref 82–98)
NONHDLC SERPL-MCNC: 83 MG/DL
NUCLEATED RBC (/100WBC) (OHS): 0 /100 WBC
PLATELET # BLD AUTO: 124 K/UL (ref 150–450)
PMV BLD AUTO: 11.9 FL (ref 9.2–12.9)
POTASSIUM SERPL-SCNC: 4.9 MMOL/L (ref 3.5–5.1)
PROT SERPL-MCNC: 6.8 GM/DL (ref 6–8.4)
RBC # BLD AUTO: 4.52 M/UL (ref 4.6–6.2)
RELATIVE EOSINOPHIL (OHS): 3.7 %
RELATIVE LYMPHOCYTE (OHS): 21.7 % (ref 18–48)
RELATIVE MONOCYTE (OHS): 8 % (ref 4–15)
RELATIVE NEUTROPHIL (OHS): 65.8 % (ref 38–73)
SODIUM SERPL-SCNC: 141 MMOL/L (ref 136–145)
TRIGL SERPL-MCNC: 102 MG/DL (ref 30–150)
WBC # BLD AUTO: 6.28 K/UL (ref 3.9–12.7)

## 2025-07-14 PROCEDURE — 82550 ASSAY OF CK (CPK): CPT

## 2025-07-14 PROCEDURE — 36415 COLL VENOUS BLD VENIPUNCTURE: CPT | Mod: PO

## 2025-07-14 PROCEDURE — 82465 ASSAY BLD/SERUM CHOLESTEROL: CPT

## 2025-07-14 PROCEDURE — 85025 COMPLETE CBC W/AUTO DIFF WBC: CPT

## 2025-07-14 PROCEDURE — 84295 ASSAY OF SERUM SODIUM: CPT

## 2025-07-17 ENCOUNTER — OFFICE VISIT (OUTPATIENT)
Dept: FAMILY MEDICINE | Facility: CLINIC | Age: 80
End: 2025-07-17
Payer: MEDICARE

## 2025-07-17 VITALS
BODY MASS INDEX: 30.78 KG/M2 | DIASTOLIC BLOOD PRESSURE: 74 MMHG | HEART RATE: 38 BPM | OXYGEN SATURATION: 98 % | SYSTOLIC BLOOD PRESSURE: 138 MMHG | HEIGHT: 68 IN | WEIGHT: 203.06 LBS

## 2025-07-17 DIAGNOSIS — I25.10 CORONARY ARTERY DISEASE, UNSPECIFIED VESSEL OR LESION TYPE, UNSPECIFIED WHETHER ANGINA PRESENT, UNSPECIFIED WHETHER NATIVE OR TRANSPLANTED HEART: ICD-10-CM

## 2025-07-17 DIAGNOSIS — E78.2 MIXED HYPERLIPIDEMIA: Primary | ICD-10-CM

## 2025-07-17 DIAGNOSIS — I10 PRIMARY HYPERTENSION: ICD-10-CM

## 2025-07-17 DIAGNOSIS — R74.8 ELEVATED CK: ICD-10-CM

## 2025-07-17 DIAGNOSIS — Z86.73 HISTORY OF CVA (CEREBROVASCULAR ACCIDENT): ICD-10-CM

## 2025-07-17 PROCEDURE — 99214 OFFICE O/P EST MOD 30 MIN: CPT | Mod: S$PBB,,, | Performed by: FAMILY MEDICINE

## 2025-07-17 PROCEDURE — G2211 COMPLEX E/M VISIT ADD ON: HCPCS | Mod: ,,, | Performed by: FAMILY MEDICINE

## 2025-07-17 PROCEDURE — 99213 OFFICE O/P EST LOW 20 MIN: CPT | Mod: PBBFAC,PO | Performed by: FAMILY MEDICINE

## 2025-07-17 PROCEDURE — 99999 PR PBB SHADOW E&M-EST. PATIENT-LVL III: CPT | Mod: PBBFAC,,, | Performed by: FAMILY MEDICINE

## 2025-07-17 NOTE — PROGRESS NOTES
Subjective:       Patient ID: Omar Araujo is a 79 y.o. male.    Chief Complaint: Preventative health care (Physical with labs prior)    Here for annual exam and to f/u on chronic health issues    CAD s/p MI, stent x 2, HTN, s/p CVA - taking ASA 81mg, Plavix 75mg, Lipitor 80mg daily, Coreg 6.25mg BID, lisinopril 10mg daily; following with cardiology, Dr. Young    Some residual issues with word-findings since stroke.    Overall feeling well.    Some achiness in bilateral upper legs with activity.          Past Medical History:   Diagnosis Date    Anticoagulant long-term use     Colon polyp     Coronary artery disease     CORONARY STENT    Hypertension     MI (myocardial infarction)     Stroke 03/21/2023       Past Surgical History:   Procedure Laterality Date    ANGIOGRAM, CAROTID N/A 5/2/2023    Procedure: Bilateral Carotid angiogram;  Surgeon: Tacos Young MD;  Location: [facility] CATH;  Service: Cardiovascular;  Laterality: N/A;    ANGIOGRAPHY  5/2/2023    Procedure: ANGIOGRAM;  Surgeon: Tacos Young MD;  Location: [facility] CATH;  Service: Cardiovascular;;    COLONOSCOPY N/A 7/18/2016    Procedure: COLONOSCOPY;  Surgeon: Carlos Gaytan Jr., MD;  Location: [facility] ENDO;  Service: Endoscopy;  Laterality: N/A;    COLONOSCOPY N/A 8/23/2021    Procedure: COLONOSCOPY;  Surgeon: Carlos Gaytan Jr., MD;  Location: [facility] ENDO;  Service: Endoscopy;  Laterality: N/A;    COLONOSCOPY W/ POLYPECTOMY  03/11/2005    ANA.   TUBULAR ADENOMA.    COLONOSCOPY W/ POLYPECTOMY  04/07/2008    ANA.   TUBULAR ADENOMA.    COLONOSCOPY W/ POLYPECTOMY  04/18/2011    ANA.   TUBULAR ADENOMAS.    CORONARY STENT PLACEMENT N/A 9/12/2018    Procedure: PCI;  Surgeon: Tacos Young MD;  Location: [facility] CATH;  Service: Cardiovascular;  Laterality: N/A;    DENTAL SURGERY      LEFT HEART CATHETERIZATION Left 8/30/2018    Procedure: Left heart cath;  Surgeon: Tacos Young MD;  Location: [facility] CATH;  Service:  Cardiovascular;  Laterality: Left;    LEFT HEART CATHETERIZATION  9/3/2024    Procedure: Left heart cath;  Surgeon: Tacos Jain MD;  Location: [facility] CATH;  Service: Cardiovascular;;    PERCUTANEOUS TRANSLUMINAL BALLOON ANGIOPLASTY OF CORONARY ARTERY  8/30/2018    Procedure: Angioplasty-coronary;  Surgeon: Tacos Young MD;  Location: [facility] CATH;  Service: Cardiovascular;;    TYMPANOPLASTY Left 03/03/2015       Review of patient's allergies indicates:   Allergen Reactions    Iodinated contrast media Hives     Stated long time ago    Iodides     Iodine        Social History     Socioeconomic History    Marital status:     Number of children: 1   Occupational History    Occupation: retired [employer]   Tobacco Use    Smoking status: Former    Smokeless tobacco: Never    Tobacco comments:     quit 30 years ago   Substance and Sexual Activity    Alcohol use: Yes     Alcohol/week: 3.0 - 4.0 standard drinks of alcohol     Types: 1 Cans of beer, 2 - 3 Glasses of wine per week     Comment: occasionally    Drug use: No    Sexual activity: Not Currently   Social History Narrative    Gardening, fishing         a [place]        Retired [place]     Social Drivers of Health     Financial Resource Strain: Low Risk  (6/13/2025)    Overall Financial Resource Strain (CARDIA)     Difficulty of Paying Living Expenses: Not hard at all   Food Insecurity: No Food Insecurity (6/13/2025)    Hunger Vital Sign     Worried About Running Out of Food in the Last Year: Never true     Ran Out of Food in the Last Year: Never true   Transportation Needs: No Transportation Needs (6/13/2025)    PRAPARE - Transportation     Lack of Transportation (Medical): No     Lack of Transportation (Non-Medical): No   Physical Activity: Insufficiently Active (6/13/2025)    Exercise Vital Sign     Days of Exercise per Week: 2 days     Minutes of Exercise per Session: 30 min   Stress: No Stress Concern Present (6/13/2025)    [employer] - Occupational Stress Questionnaire     Feeling of Stress : Not at all   Housing Stability: Low Risk  (9/14/2023)    Housing Stability Vital Sign     Unable to Pay for Housing in the Last Year: No     Number of Places Lived in the Last Year: 1     Unstable Housing in the Last Year: No       Current Outpatient Medications on File Prior to Visit   Medication Sig Dispense Refill    ascorbic acid, vitamin C, (VITAMIN C) 500 MG tablet Take 500 mg by mouth once daily.      aspirin 81 MG Chew Take 1 tablet (81 mg total) by mouth once daily. 360 tablet 0    atorvastatin (LIPITOR) 80 MG tablet Take 0.5 tablets (40 mg total) by mouth every evening. 45 tablet 3    carvediloL (COREG) 6.25 MG tablet TAKE 1 TABLET (6.25 MG TOTAL) BY MOUTH TWICE DAILY. 180 tablet 1    cholecalciferol, vitamin D3, (VITAMIN D3 ORAL) Take by mouth once daily.       clopidogreL (PLAVIX) 75 mg tablet Take 1 tablet (75 mg total) by mouth once daily. 30 tablet 11    fish oil-omega-3 fatty acids 300-1,000 mg capsule Take 1 g by mouth once daily.      lisinopriL 10 MG tablet TAKE 1 TABLET (10 MG TOTAL) BY MOUTH DAILY. 60 tablet 2    lutein 6 mg Cap Take 1 capsule by mouth once daily.       multivitamin (THERAGRAN) per tablet Take 1 tablet by mouth once daily.      vitamin E 400 UNIT capsule Take 400 Units by mouth once daily.       No current facility-administered medications on file prior to visit.       Family History   Problem Relation Name Age of Onset    No Known Problems Mother      Heart disease Father      Breast cancer Sister      Diabetes Neg Hx      Hypertension Neg Hx      Thyroid disease Neg Hx         Review of Systems   Constitutional:  Negative for appetite change, chills, fever and unexpected weight change.   HENT:  Negative for sore throat and trouble swallowing.    Eyes:  Negative for pain and visual disturbance.   Respiratory:  Negative for cough, chest tightness, shortness of breath and wheezing.   "  Cardiovascular:  Negative for chest pain, palpitations and leg swelling.   Gastrointestinal:  Negative for abdominal pain, blood in stool, constipation, diarrhea and nausea.   Genitourinary:  Negative for difficulty urinating, dysuria and hematuria.   Musculoskeletal:  Positive for arthralgias and back pain. Negative for gait problem and neck pain.   Skin:  Negative for rash and wound.   Neurological:  Negative for dizziness, weakness, light-headedness and headaches.   Hematological:  Negative for adenopathy.   Psychiatric/Behavioral:  Negative for dysphoric mood.        Objective:      /74   Pulse (!) 38   Ht 5' 8" (1.727 m)   Wt 92.1 kg (203 lb 0.7 oz)   SpO2 98%   BMI 30.87 kg/m²   Physical Exam  Constitutional:       General: He is not in acute distress.     Appearance: He is well-developed.   HENT:      Head: Normocephalic and atraumatic.      Right Ear: External ear normal.      Left Ear: External ear normal.      Mouth/Throat:      Pharynx: Uvula midline. No oropharyngeal exudate.   Eyes:      General: Lids are normal.      Conjunctiva/sclera: Conjunctivae normal.      Pupils: Pupils are equal, round, and reactive to light.   Neck:      Thyroid: No thyroid mass or thyromegaly.      Trachea: Phonation normal.   Cardiovascular:      Rate and Rhythm: Normal rate and regular rhythm.      Heart sounds: Normal heart sounds. No murmur heard.     No friction rub. No gallop.   Pulmonary:      Effort: Pulmonary effort is normal. No respiratory distress.      Breath sounds: Normal breath sounds. No wheezing or rales.   Abdominal:      General: Abdomen is flat. Bowel sounds are normal.      Palpations: Abdomen is soft.   Musculoskeletal:         General: Normal range of motion.      Cervical back: Full passive range of motion without pain, normal range of motion and neck supple.      Lumbar back: Normal. No swelling or deformity. Normal range of motion. No scoliosis.   Lymphadenopathy:      Cervical: No " cervical adenopathy.   Skin:     General: Skin is warm and dry.   Neurological:      Mental Status: He is alert and oriented to person, place, and time.      Cranial Nerves: No cranial nerve deficit.      Coordination: Coordination normal.   Psychiatric:         Speech: Speech normal.         Behavior: Behavior normal.         Thought Content: Thought content normal.         Judgment: Judgment normal.         Results for orders placed or performed in visit on 07/14/25   Comprehensive Metabolic Panel    Collection Time: 07/14/25  8:13 AM   Result Value Ref Range    Sodium 141 136 - 145 mmol/L    Potassium 4.9 3.5 - 5.1 mmol/L    Chloride 108 95 - 110 mmol/L    CO2 24 23 - 29 mmol/L    Glucose 127 (H) 70 - 110 mg/dL    BUN 23 8 - 23 mg/dL    Creatinine 1.1 0.5 - 1.4 mg/dL    Calcium 9.2 8.7 - 10.5 mg/dL    Protein Total 6.8 6.0 - 8.4 gm/dL    Albumin 4.2 3.5 - 5.2 g/dL    Bilirubin Total 0.8 0.1 - 1.0 mg/dL    ALP 67 40 - 150 unit/L    AST 17 11 - 45 unit/L    ALT 25 10 - 44 unit/L    Anion Gap 9 8 - 16 mmol/L    eGFR >60 >60 mL/min/1.73/m2   CK    Collection Time: 07/14/25  8:13 AM   Result Value Ref Range     (H) 20 - 200 U/L   Lipid Panel    Collection Time: 07/14/25  8:13 AM   Result Value Ref Range    Cholesterol Total 130 120 - 199 mg/dL    Triglyceride 102 30 - 150 mg/dL    HDL Cholesterol 47 40 - 75 mg/dL    LDL Cholesterol 62.6 (L) 63.0 - 159.0 mg/dL    HDL/Cholesterol Ratio 36.2 20.0 - 50.0 %    Cholesterol/HDL Ratio 2.8 2.0 - 5.0    Non HDL Cholesterol 83 mg/dL   CBC with Differential    Collection Time: 07/14/25  8:13 AM   Result Value Ref Range    WBC 6.28 3.90 - 12.70 K/uL    RBC 4.52 (L) 4.60 - 6.20 M/uL    HGB 15.8 14.0 - 18.0 gm/dL    HCT 46.1 40.0 - 54.0 %     (H) 82 - 98 fL    MCH 35.0 (H) 27.0 - 31.0 pg    MCHC 34.3 32.0 - 36.0 g/dL    RDW 12.9 11.5 - 14.5 %    Platelet Count 124 (L) 150 - 450 K/uL    MPV 11.9 9.2 - 12.9 fL    Nucleated RBC 0 <=0 /100 WBC    Neut % 65.8 38 - 73 %     Lymph % 21.7 18 - 48 %    Mono % 8.0 4 - 15 %    Eos % 3.7 <=8 %    Basophil % 0.6 <=1.9 %    Imm Grans % 0.2 0.0 - 0.5 %    Neut # 4.14 1.8 - 7.7 K/uL    Lymph # 1.36 1 - 4.8 K/uL    Mono # 0.50 0.3 - 1 K/uL    Eos # 0.23 <=0.5 K/uL    Baso # 0.04 <=0.2 K/uL    Imm Grans # 0.01 0.00 - 0.04 K/uL       Assessment:       1. Mixed hyperlipidemia    2. Elevated CK    3. History of CVA (cerebrovascular accident)    4. Primary hypertension    5. Coronary artery disease, unspecified vessel or lesion type, unspecified whether angina present, unspecified whether native or transplanted heart            Plan:       Mixed hyperlipidemia  -     PHARMACOGENOMICS PANEL; Future; Expected date: 07/17/2025    Elevated CK  -     CK; Future; Expected date: 07/17/2025    History of CVA (cerebrovascular accident)    Primary hypertension    Coronary artery disease, unspecified vessel or lesion type, unspecified whether angina present, unspecified whether native or transplanted heart            CK elevated on recent labs; will recheck this with pharm panel in 2 weeks to see if statin is source of pain  Overall doing well  Continue present meds and cardiology follow-up  Counseled on regular exercise, maintenance of a healthy weight, balanced diet rich in fruits/vegetables and lean protein, and avoidance of unhealthy habits like smoking and excessive alcohol intake.  F/u annually or PRN    Visit today included increased complexity associated with the care of the episodic problems listed above addressed and managing the longitudinal care of the patient due to the serious and/or complex managed problem(s) listed above.

## 2025-07-31 ENCOUNTER — LAB VISIT (OUTPATIENT)
Dept: LAB | Facility: HOSPITAL | Age: 80
End: 2025-07-31
Attending: FAMILY MEDICINE
Payer: MEDICARE

## 2025-07-31 DIAGNOSIS — E78.2 MIXED HYPERLIPIDEMIA: ICD-10-CM

## 2025-07-31 DIAGNOSIS — R74.8 ELEVATED CK: ICD-10-CM

## 2025-07-31 LAB — CK SERPL-CCNC: 758 U/L (ref 20–200)

## 2025-07-31 PROCEDURE — 82550 ASSAY OF CK (CPK): CPT

## 2025-07-31 PROCEDURE — 36415 COLL VENOUS BLD VENIPUNCTURE: CPT | Mod: PO

## 2025-08-05 ENCOUNTER — TELEPHONE (OUTPATIENT)
Dept: FAMILY MEDICINE | Facility: CLINIC | Age: 80
End: 2025-08-05
Payer: MEDICARE

## 2025-08-05 NOTE — TELEPHONE ENCOUNTER
----- Message from Margot sent at 8/4/2025  4:07 PM CDT -----  Regarding: Lab Results  Pt would like someone to call him in regards to his lab results.   He states some are abnormal.    Pt can be reached at [phone].   Thank you

## 2025-08-08 LAB
ONEOME COMMENT: NORMAL
ONEOME METHOD: NORMAL

## 2025-08-22 ENCOUNTER — OFFICE VISIT (OUTPATIENT)
Dept: FAMILY MEDICINE | Facility: CLINIC | Age: 80
End: 2025-08-22
Payer: MEDICARE

## 2025-08-22 VITALS
HEART RATE: 80 BPM | DIASTOLIC BLOOD PRESSURE: 88 MMHG | SYSTOLIC BLOOD PRESSURE: 126 MMHG | OXYGEN SATURATION: 95 % | HEIGHT: 68 IN | BODY MASS INDEX: 31.17 KG/M2 | WEIGHT: 205.69 LBS

## 2025-08-22 DIAGNOSIS — I25.10 CORONARY ARTERY DISEASE INVOLVING NATIVE CORONARY ARTERY OF NATIVE HEART WITHOUT ANGINA PECTORIS: ICD-10-CM

## 2025-08-22 DIAGNOSIS — R74.8 ELEVATED CK: ICD-10-CM

## 2025-08-22 DIAGNOSIS — I25.10 CORONARY ARTERY DISEASE, UNSPECIFIED VESSEL OR LESION TYPE, UNSPECIFIED WHETHER ANGINA PRESENT, UNSPECIFIED WHETHER NATIVE OR TRANSPLANTED HEART: ICD-10-CM

## 2025-08-22 DIAGNOSIS — M79.652 PAIN IN BOTH THIGHS: Primary | ICD-10-CM

## 2025-08-22 DIAGNOSIS — M79.651 PAIN IN BOTH THIGHS: Primary | ICD-10-CM

## 2025-08-22 PROCEDURE — 99999 PR PBB SHADOW E&M-EST. PATIENT-LVL IV: CPT | Mod: PBBFAC,,, | Performed by: FAMILY MEDICINE

## 2025-08-22 PROCEDURE — 99214 OFFICE O/P EST MOD 30 MIN: CPT | Mod: S$PBB,,, | Performed by: FAMILY MEDICINE

## 2025-08-22 PROCEDURE — G2211 COMPLEX E/M VISIT ADD ON: HCPCS | Mod: ,,, | Performed by: FAMILY MEDICINE

## 2025-08-22 PROCEDURE — 99214 OFFICE O/P EST MOD 30 MIN: CPT | Mod: PBBFAC,PO | Performed by: FAMILY MEDICINE
